# Patient Record
Sex: FEMALE | Race: WHITE | Employment: FULL TIME | URBAN - METROPOLITAN AREA
[De-identification: names, ages, dates, MRNs, and addresses within clinical notes are randomized per-mention and may not be internally consistent; named-entity substitution may affect disease eponyms.]

---

## 2017-03-31 ENCOUNTER — HOSPITAL ENCOUNTER (OUTPATIENT)
Dept: MAMMOGRAPHY | Age: 52
Discharge: HOME | End: 2017-03-31
Attending: SPECIALIST
Payer: COMMERCIAL

## 2017-03-31 DIAGNOSIS — Z12.31 VISIT FOR SCREENING MAMMOGRAM: ICD-10-CM

## 2017-03-31 PROCEDURE — 77067 SCR MAMMO BI INCL CAD: CPT

## 2017-12-04 ENCOUNTER — OFFICE VISIT (OUTPATIENT)
Dept: ENDOCRINOLOGY | Age: 52
End: 2017-12-04

## 2017-12-04 VITALS
DIASTOLIC BLOOD PRESSURE: 48 MMHG | WEIGHT: 137.4 LBS | BODY MASS INDEX: 22.89 KG/M2 | HEIGHT: 65 IN | SYSTOLIC BLOOD PRESSURE: 99 MMHG | HEART RATE: 76 BPM

## 2017-12-04 DIAGNOSIS — E03.9 HYPOTHYROIDISM (ACQUIRED): Primary | ICD-10-CM

## 2017-12-04 RX ORDER — MULTIVIT WITH MINERALS/HERBS
1 TABLET ORAL DAILY
COMMUNITY
End: 2020-04-28

## 2017-12-04 RX ORDER — CEPHRADINE 500 MG
CAPSULE ORAL DAILY
COMMUNITY
End: 2020-04-28

## 2017-12-04 RX ORDER — THYROID, PORCINE 120 MG/1
TABLET ORAL DAILY
Refills: 0 | COMMUNITY
Start: 2017-12-01 | End: 2017-12-09 | Stop reason: SDUPTHER

## 2017-12-04 RX ORDER — DROSPIRENONE AND ETHINYL ESTRADIOL 0.02-3(28)
KIT ORAL
COMMUNITY
Start: 2017-11-28 | End: 2017-12-04 | Stop reason: SDUPTHER

## 2017-12-04 RX ORDER — DROSPIRENONE, ETHINYL ESTRADIOL AND LEVOMEFOLATE CALCIUM AND LEVOMEFOLATE CALCIUM 3-0.02(24)
KIT ORAL DAILY
COMMUNITY
End: 2021-11-02

## 2017-12-04 NOTE — PROGRESS NOTES
Chief Complaint   Patient presents with    Thyroid Problem     no pcp and pharmacy confirmed     History of Present Illness: Kamila Thorpe is a 50 y.o. female who is a new patient for thyroid. Was diagnosed about 3 years ago and was initially put on levothyroxine by [facility] but decided to be seen by [address] [place] [address] and was tried on rabbit thyroid drops but still didn't feel well and Dr. Francena Schirmer recommended taking a pill instead and since she wanted to first stay on the natural thyroid hormone, he prescribed armour thyroid and initially was on 180 mg daily and TSH was 0.05 in 12/16 and her dose was cut back to 150 mg daily and TSH was still low at 0.034 in 3/17. Testosterone was low at < 3 in 3/17 and was started on pellet implantation at that time. Thinks her armour was then cut back to 120 mg daily and TSH was up to 0.247 in 5/17 and testosterone was 220. Then TSH was back to normal at 1.14 and FT4 was low at 0.7 in 6/17 and dose was kept the same and most recently in 9/17, TSH was 0.905 and FT4 0.73 and T was 283 and hasn't had further dose changes. Did have her 2nd pellet insertion in 8/17. Takes the armour about 90 minutes after dinner. Was taking a calcium at one point so she wanted to keep the armour separate and this is the reason for taking it at night. Doesn't recall if she felt better with taking the dose of armour in the morning as she didn't do this very long but this may have been just with the thyroid drops and never with the armour. Gets the armour everyday. Despite compliance still has fatigue and feels about 50% better than she did prior to taking any thyroid medication and does feel better on the armour than on the drops. No weight gain or loss. (+) cold intolerance that is not better on meds. Still has some constipation that is unchanged and eats 100% plant based diet. Some dry skin that is unchanged. Less hair loss on the armour.   Some brittle nails that may be worse. Past Medical History:   Diagnosis Date    Hypothyroidism (acquired) 2014     Past Surgical History:   Procedure Laterality Date    HX CYST REMOVAL  in her 25s    from right wrist     Current Outpatient Prescriptions   Medication Sig    drospirenone-e.estradiol-lm. FA 3-0.02-0.451 mg (24) (4) tab Take  by mouth daily.  ARMOUR THYROID 120 mg tab daily.  testosterone 75 mg pllt by Implant route. Every 5-6 months from Dr. Kd Miranda b complex vitamins (B COMPLEX 1) tablet Take 1 Tab by mouth daily.  cholecalciferol, vitamin d3, 10,000 unit cap Take  by mouth daily.  HOP-BLK COH-RHODIOLA-FLAX-PRIM PO Take  by mouth daily. For energy    zinc 50 mg tab tablet Take  by mouth daily.  POTASSIUM/MAGNESIUM (MAGNESIUM-POTASSIUM PO) Take  by mouth daily.  DOCUSATE CALCIUM (STOOL SOFTENER PO) Take  by mouth daily.  TURMERIC, BULK, by Does Not Apply route as needed. No current facility-administered medications for this visit. No Known Allergies     Family History   Problem Relation Age of Onset    Other Mother      brain hemorrhage    Stroke Father     Prostate Cancer Father     Other Sister      Sjogren's     Thyroid Disease Neg Hx      Social History     Social History    Marital status: N/A     Spouse name: N/A    Number of children: N/A    Years of education: N/A     Occupational History    Not on file. Social History Main Topics    Smoking status: Former Smoker     Types: Cigarettes     Quit date: 1/1/1999    Smokeless tobacco: Former User    Alcohol use No    Drug use: No    Sexual activity: Not on file     Other Topics Concern    Not on file     Social History Narrative    Lives in [place] with  and 15 yo daughter. 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      Review of Systems:  - Constitutional Symptoms: no fevers, chills, weight loss  - Eyes: no blurry vision or double vision, occ floaters  - Cardiovascular: no chest pain or palpitations but did have some on the higher doses of armour in the spring of 2017  - Respiratory: no cough or shortness of breath  - Gastrointestinal: no dysphagia or abdominal pain  - Musculoskeletal: (+) joint pains in the knees  - Integumentary: no rashes  - Neurological: no numbness, tingling, or headaches  - Psychiatric: no depression or anxiety  - Endocrine: (+) cold intolerance, no polyuria or polydipsia    Physical Examination:  Blood pressure 99/48, pulse 76, height 5' 5\" (1.651 m), weight 137 lb 6.4 oz (62.3 kg). - General: pleasant, no distress, good eye contact  - HEENT: no exopthalmos, no periorbital edema, no scleral/conjunctival injection, EOMI, no lid lag or stare  - Neck: supple, small amount of palpable thyroid tissue without nodules or thyroid bruits, no masses, lymph nodes, or carotid bruits, no supraclavicular or dorsocervical fat pads  - Cardiovascular: regular, normal rate, normal S1 and S2, no murmurs/rubs/gallops   - Respiratory: clear to auscultation bilaterally  - Gastrointestinal: soft, nontender, nondistended, no masses, no hepatosplenomegaly  - Musculoskeletal: no proximal muscle weakness in upper or lower extremities  - Integumentary: no acanthosis nigricans, no abdominal striae, no rashes, no edema  - Neurological: reflexes 2+ at biceps, no tremor  - Psychiatric: normal mood and affect    Data Reviewed:   - labs as above    Assessment/Plan:   1. Hypothyroidism (acquired):  Was diagnosed about 2014 and was initially put on levothyroxine by [facility] but decided to be seen by Lorena Melara and was tried on rabbit thyroid drops but still didn't feel well and Dr. Lacy Rashid recommended taking a pill instead and since she wanted to first stay on the natural thyroid hormone, he prescribed armour thyroid and initially was on 180 mg daily and TSH was 0.05 in 12/16 and her dose was cut back to 150 mg daily and TSH was still low at 0.034 in 3/17. Testosterone was low at < 3 in 3/17 and was started on pellet implantation at that time. Thinks her armour was then cut back to 120 mg daily and TSH was up to 0.247 in 5/17 and testosterone was 220. Then TSH was back to normal at 1.14 and FT4 was low at 0.7 in 6/17 and dose was kept the same and most recently in 9/17, TSH was 0.905 and FT4 0.73 and T was 283 and hasn't had further dose changes. Did have her 2nd pellet insertion in 8/17. Takes the armour about 90 minutes after dinner. Given she is having daytime fatigue and some insomnia, advised her to move the armour to the morning to see if this helps with energy. May benefit from changing to synthetic in the future. Based on her body weight, would think she would only need 60 of armour or 100 of synthetic so may be having some interference from food. Will screen for Hashimotos. - cont armour 120 mg but move to the morning until labs are back  - check TSH, free T4, and total T3 today and prior to next visit  - check TPO ab today       Patient Instructions   1) TSH is a thyroid test.  Your level was 0.9 in September which is normal and at goal of 0.5-2.0. This test goes opposite of your thyroid dose and suggests your dose of armour is likely adequate but I will repeat your labs to see if you need a dose change. 2) Move the armour to the morning at least 30 minutes before food and coffee. It's okay to take b-complex vitamin at that time but move the rest of the supplements to at least 4 hours later in the day. 3) We'll see if this change is enough to help with energy during the day. If still feeling tired in 6-8 weeks, let me know and we can consider a trial of brand synthetic hormone like synthroid or levoxyl or unithroid. 4) I will send you a message through [facility] with your lab results and a plan.     5) The other test we will check is called a TPO antibody level which let me know if you have a condition called hashimoto's disease, or autoimmune thyroid disease, where you would benefit from continued treatment of your thyroid but this can also be genetic so your children could be at risk of developing a thyroid condition if you have this. 6) I will send you a reminder e-mail 3-4 weeks prior to your next visit and you will have the order already in the [email or web address] system so you can just go sometime in the 3-7 days before the next visit to have your labs drawn. Follow-up Disposition:  Return in about 4 months (around 4/4/2018). Copy sent to:  Dr. Rhoda Chappell    Lab follow up: 12/9/17  Component      Latest Ref Rng & Units 12/4/2017 12/4/2017 12/4/2017 12/4/2017          12:36 PM 12:36 PM 12:36 PM 12:36 PM   T4, Free      0.82 - 1.77 ng/dL    0.69 (L)   TSH      0.450 - 4.500 uIU/mL   1.280    T3, total      71 - 180 ng/dL  126     Thyroid peroxidase Ab      0 - 34 IU/mL 134 (H)        Sent her the following message through [facility]:  TSH is a thyroid test.  Your level is 1.28 which is normal and at goal of 0.5-2.0. This test goes opposite of your thyroid dose and suggests your dose of armour is perfect so I will keep your dose the same and have sent a 90 day supply to your local [facility]. If you would like to send this to the mail order in the future, just let me know which one you will be using and I can send it there. As we discussed, move the pill to the morning so it doesn't interfere with food and your sleep and we'll see if you are feeling better in 6-8 weeks. If not, let me know. Your TPO antibody was high which indicates that you do have an autoimmune thyroid disease called Hashimoto's disease as the cause of your hypothyroidism and therefore your daughter may be at risk of developing a thyroid condition in the future so I would be sure to let her know so she is aware to be checked in the future if she has any hypothyroid symptoms.

## 2017-12-04 NOTE — MR AVS SNAPSHOT
Visit Information Date & Time Provider Department Dept. Phone Encounter #  
 12/4/2017 10:50 AM Akhil Cruz MD [facility] [id number] [phone] Follow-up Instructions Return in about 4 months (around 4/4/2018). Upcoming Health Maintenance Date Due DTaP/Tdap/Td series (1 - Tdap) 7/24/1988 PAP AKA CERVICAL CYTOLOGY 7/24/1988 BREAST CANCER SCRN MAMMOGRAM 7/24/2017 FOBT Q 1 YEAR AGE 50-75 7/24/2017 Influenza Age 5 to Adult 8/1/2017 Allergies as of 12/4/2017  Review Complete On: 12/4/2017 By: Akhil Cruz MD  
 No Known Allergies Current Immunizations  Never Reviewed No immunizations on file. Not reviewed this visit You Were Diagnosed With   
  
 Codes Comments Hypothyroidism (acquired)    -  Primary ICD-10-CM: E03.9 ICD-9-CM: 608. 9 Vitals BP Pulse Height(growth percentile) Weight(growth percentile) BMI Smoking Status 99/48 76 5' 5\" (1.651 m) 137 lb 6.4 oz (62.3 kg) 22.86 kg/m2 Former Smoker Vitals History BMI and BSA Data Body Mass Index Body Surface Area  
 22.86 kg/m 2 1.69 m 2 Preferred Pharmacy Pharmacy Name Phone [id number] [address] [phone] Your Updated Medication List  
  
   
This list is accurate as of: 12/4/17 12:31 PM.  Always use your most recent med list.  
  
  
  
  
 ARMOUR THYROID 120 mg Tab Generic drug:  Thyroid (Pork) daily. B COMPLEX 1 tablet Generic drug:  b complex vitamins Take 1 Tab by mouth daily. cholecalciferol (vitamin d3) 10,000 unit Cap Take  by mouth daily. drospirenone-e.estradiol-lm. FA 3-0.02-0.451 mg (24) (4) Tab Take  by mouth daily. HOP-BLK COH-RHODIOLA-FLAX-PRIM PO Take  by mouth daily. For energy MAGNESIUM-POTASSIUM PO Take  by mouth daily. STOOL SOFTENER PO Take  by mouth daily. testosterone 75 mg Pllt by Implant route. Every 5-6 months from Dr. Destiny COKER (BULK)  
by Does Not Apply route as needed. zinc 50 mg Tab tablet Take  by mouth daily. We Performed the Following CA COLLECTION VENOUS BLOOD,VENIPUNCTURE [id number] CPT(R)] CA HANDLG&/OR CONVEY OF SPEC FOR TR OFFICE TO LAB [72416 CPT(R)]   
 T3 TOTAL [55951 CPT(R)] T4, FREE [id number] CPT(R)] THYROID PEROXIDASE (TPO) AB [16611 CPT(R)] TSH 3RD GENERATION [18145 CPT(R)] Follow-up Instructions Return in about 4 months (around 4/4/2018). Patient Instructions 1) TSH is a thyroid test.  Your level was 0.9 in September which is normal and at goal of 0.5-2.0. This test goes opposite of your thyroid dose and suggests your dose of armour is likely adequate but I will repeat your labs to see if you need a dose change. 2) Move the armour to the morning at least 30 minutes before food and coffee. It's okay to take b-complex vitamin at that time but move the rest of the supplements to at least 4 hours later in the day. 3) We'll see if this change is enough to help with energy during the day. If still feeling tired in 6-8 weeks, let me know and we can consider a trial of brand synthetic hormone like synthroid or levoxyl or unithroid. 4) I will send you a message through [email or web address] with your lab results and a plan. 5) The other test we will check is called a TPO antibody level which let me know if you have a condition called hashimoto's disease, or autoimmune thyroid disease, where you would benefit from continued treatment of your thyroid but this can also be genetic so your children could be at risk of developing a thyroid condition if you have this. 6) I will send you a reminder e-mail 3-4 weeks prior to your next visit and you will have the order already in the [employer] [facility] system so you can just go sometime in the 3-7 days before the next visit to have your labs drawn. Introducing [facility]! Manpreet Karely introduces [employer] patient portal. Now you can access parts of your medical record, email your doctor's office, and request medication refills online. 1. In your internet browser, go to [email or web address] 2. Click on the First Time User? Click Here link in the Sign In box. You will see the New Member Sign Up page. 3. Enter your [employer] Access Code exactly as it appears below. You will not need to use this code after youve completed the sign-up process. If you do not sign up before the expiration date, you must request a new code. · [employer] Access Code: [email or web address] Expires: 3/4/2018 12:31 PM 
 
4. Enter the last four digits of your Social Security Number (xxxx) and Date of Birth (mm/dd/yyyy) as indicated and click Submit. You will be taken to the next sign-up page. 5. Create a [employer] ID. This will be your [employer] login ID and cannot be changed, so think of one that is secure and easy to remember. 6. Create a [employer] password. You can change your password at any time. 7. Enter your Password Reset Question and Answer. This can be used at a later time if you forget your password. 8. Enter your e-mail address. You will receive e-mail notification when new information is available in [address] 9. Click Sign Up. You can now view and download portions of your medical record. 10. Click the Download Summary menu link to download a portable copy of your medical information. If you have questions, please visit the Frequently Asked Questions section of the [employer] website. Remember, [employer] is NOT to be used for urgent needs. For medical emergencies, dial 911. Now available from your iPhone and Android! Please provide this summary of care documentation to your next provider. If you have any questions after today's visit, please call [phone].

## 2017-12-04 NOTE — PATIENT INSTRUCTIONS
1) TSH is a thyroid test.  Your level was 0.9 in September which is normal and at goal of 0.5-2.0. This test goes opposite of your thyroid dose and suggests your dose of armour is likely adequate but I will repeat your labs to see if you need a dose change. 2) Move the armour to the morning at least 30 minutes before food and coffee. It's okay to take b-complex vitamin at that time but move the rest of the supplements to at least 4 hours later in the day. 3) We'll see if this change is enough to help with energy during the day. If still feeling tired in 6-8 weeks, let me know and we can consider a trial of brand synthetic hormone like synthroid or levoxyl or unithroid. 4) I will send you a message through [email or web address] with your lab results and a plan. 5) The other test we will check is called a TPO antibody level which let me know if you have a condition called hashimoto's disease, or autoimmune thyroid disease, where you would benefit from continued treatment of your thyroid but this can also be genetic so your children could be at risk of developing a thyroid condition if you have this. 6) I will send you a reminder e-mail 3-4 weeks prior to your next visit and you will have the order already in the [employer] system so you can just go sometime in the 3-7 days before the next visit to have your labs drawn.

## 2017-12-05 LAB
T3 SERPL-MCNC: 126 NG/DL (ref 71–180)
T4 FREE SERPL-MCNC: 0.69 NG/DL (ref 0.82–1.77)
THYROPEROXIDASE AB SERPL-ACNC: 134 IU/ML (ref 0–34)
TSH SERPL DL<=0.005 MIU/L-ACNC: 1.28 UIU/ML (ref 0.45–4.5)

## 2017-12-09 RX ORDER — THYROID, PORCINE 120 MG/1
TABLET ORAL
Qty: 90 TAB | Refills: 3 | Status: SHIPPED | OUTPATIENT
Start: 2017-12-09 | End: 2018-05-23 | Stop reason: ALTCHOICE

## 2018-04-02 ENCOUNTER — HOSPITAL ENCOUNTER (OUTPATIENT)
Dept: MAMMOGRAPHY | Age: 53
Discharge: HOME | End: 2018-04-02
Attending: SPECIALIST
Payer: COMMERCIAL

## 2018-04-02 DIAGNOSIS — Z12.31 VISIT FOR SCREENING MAMMOGRAM: ICD-10-CM

## 2018-04-02 PROCEDURE — 77067 SCR MAMMO BI INCL CAD: CPT

## 2018-04-03 LAB
T3 SERPL-MCNC: 182 NG/DL (ref 71–180)
T4 FREE SERPL-MCNC: 0.77 NG/DL (ref 0.82–1.77)
THYROPEROXIDASE AB SERPL-ACNC: 110 IU/ML (ref 0–34)
TSH SERPL DL<=0.005 MIU/L-ACNC: 0.52 UIU/ML (ref 0.45–4.5)

## 2018-04-23 ENCOUNTER — OFFICE VISIT (OUTPATIENT)
Dept: ENDOCRINOLOGY | Age: 53
End: 2018-04-23

## 2018-04-23 VITALS
BODY MASS INDEX: 23.49 KG/M2 | OXYGEN SATURATION: 99 % | HEART RATE: 61 BPM | HEIGHT: 65 IN | SYSTOLIC BLOOD PRESSURE: 114 MMHG | WEIGHT: 141 LBS | DIASTOLIC BLOOD PRESSURE: 66 MMHG | RESPIRATION RATE: 12 BRPM

## 2018-04-23 DIAGNOSIS — E06.3 HASHIMOTO'S DISEASE: Primary | ICD-10-CM

## 2018-04-23 RX ORDER — LEVOTHYROXINE SODIUM 88 UG/1
TABLET ORAL
Qty: 70 TAB | Refills: 0 | Status: SHIPPED | COMMUNITY
Start: 2018-04-23 | End: 2018-05-23 | Stop reason: DRUGHIGH

## 2018-04-23 NOTE — PROGRESS NOTES
Chief Complaint   Patient presents with    Thyroid Problem     Pharmacy Verified. PCP [facility]     History of Present Illness: Josie Zamora is a 50 y.o. female here for follow up of thyroid. Weight up 4 lbs since last visit in 12/17. Moved the aayush thyroid to the morning about 4:30-5am and takes this with water at least 30-60 min before food and coffee. Still has noticed some fatigue upon awakening and in the evening has had some heart fluttering. Sleep is a little better than before. Bowels are regular as long as she takes a stool softener. Some brittle nails but unchanged. No hair loss. Always cold and unchanged. Just had a recent pellet adjustment about 2-3 weeks ago and prior to this the last insertion was in 1/18. Would like to try changing to synthetic unithroid to see if she feels better especially as she tends to eat plant based foods and would like to avoid animal products if possible. Current Outpatient Prescriptions   Medication Sig    AAYUSH THYROID 120 mg tab Take 1 tab daily    drospirenone-e.estradiol-lm. FA 3-0.02-0.451 mg (24) (4) tab Take  by mouth daily.  testosterone 75 mg pllt by Implant route. Every 5-6 months from Dr. Salvador Hoang b complex vitamins (B COMPLEX 1) tablet Take 1 Tab by mouth daily.  cholecalciferol, vitamin d3, 10,000 unit cap Take  by mouth daily.  HOP-BLK COH-RHODIOLA-FLAX-PRIM PO Take  by mouth daily. For energy    zinc 50 mg tab tablet Take  by mouth daily.  POTASSIUM/MAGNESIUM (MAGNESIUM-POTASSIUM PO) Take  by mouth daily.  DOCUSATE CALCIUM (STOOL SOFTENER PO) Take  by mouth daily.  TURMERIC, BULK, by Does Not Apply route as needed. No current facility-administered medications for this visit. No Known Allergies     Review of Systems:  - Cardiovascular: no chest pain  - Neurological: no tremors  - Integumentary: skin is normal    Physical Examination:  Blood pressure 114/66, pulse 61, resp.  rate 12, height 5' 5\" (1.651 m), weight 141 lb (64 kg), SpO2 99 %. - General: pleasant, no distress, good eye contact   - Neck: small goiter  - Cardiovascular: regular, normal rate, nl s1 and s2, no m/r/g   - Respiratory: clear to auscultation bilaterally   - Integumentary: skin is normal, no edema  - Neurological: reflexes 2+ at biceps, no tremors  - Psychiatric: normal mood and affect    Data Reviewed:   Component      Latest Ref Rng & Units 4/2/2018 4/2/2018 4/2/2018 4/2/2018          10:52 AM 10:52 AM 10:52 AM 10:52 AM   T4, Free      0.82 - 1.77 ng/dL    0.77 (L)   TSH      0.450 - 4.500 uIU/mL   0.521    T3, total      71 - 180 ng/dL  182 (H)     Thyroid peroxidase Ab      0 - 34 IU/mL 110 (H)          Assessment/Plan:     1. Hashimoto's disease: Was diagnosed about 2014 and was initially put on levothyroxine by [facility] but decided to be seen by [address] [place] [address] and was tried on rabbit thyroid drops but still didn't feel well and Dr. Homero Mcgee recommended taking a pill instead and since she wanted to first stay on the natural thyroid hormone, he prescribed armour thyroid and initially was on 180 mg daily and TSH was 0.05 in 12/16 and her dose was cut back to 150 mg daily and TSH was still low at 0.034 in 3/17. Testosterone was low at < 3 in 3/17 and was started on pellet implantation at that time. Thinks her armour was then cut back to 120 mg daily and TSH was up to 0.247 in 5/17 and testosterone was 220. Then TSH was back to normal at 1.14 and FT4 was low at 0.7 in 6/17 and dose was kept the same and most recently in 9/17, TSH was 0.905 and FT4 0.73 and T was 283 and hasn't had further dose changes. Did have her 2nd pellet insertion in 8/17. Takes the armour about 90 minutes after dinner. TSH 1.28 and TPO ab 134 in 12/17 at her first visit with me confirming Hashimoto's.   I kept her dose the same and given she was having daytime fatigue and some insomnia, advised her to move the armour to the morning to see if this helps with energy. TSH 0.52 and T3 182 and FT4 0.77 and TPO ab 110 in 4/18. She would like to try synthetic so will change to unithroid. 120 of armour = 200 of unithroid but I'm concerned this dose will be too much based on her body weight and already has some fluttering so will use 175 mcg of unithroid as a starting dose with 88 mcg tabs to take 2 tabs daily for the next 5 weeks. We spent 25 minutes of face to face time together and > 50% of the time was spent in counseling regarding management of her thyroid  - change to unithroid 88 mcg 2 tabs daily for the next 5 weeks  - check TSH, free T4, and total T3 in 4 weeks and prior to next visit       Patient Instructions   1) TSH is a thyroid test.  Your level is 0.52 which is normal and at goal of 0.5-2.0. This test goes opposite of your thyroid dose and suggests your dose of armour is adequate. However, we will do a trial of unithroid 176 mcg daily for the next 4 weeks to see if you feel better on this. 2) Take 2 of the 88 mcg tabs together in the morning as you have been doing with the armour and we'll see if you feel better on this than on the armour. If so, plan on repeating your labs after the 4th week and I'll e-mail you the results. Then if the labs look good and you are feeling better, we can send to mail order. If not feeling better, than stay on the armour and I can send this to mail order. Follow-up Disposition:  Return in about 6 months (around 10/23/2018).     Copy sent to:  Dr. Gallo Floyd    Lab follow up: 5/23/18  Component      Latest Ref Rng & Units 5/22/2018 5/22/2018 5/22/2018           8:26 AM  8:26 AM  8:26 AM   TSH      0.450 - 4.500 uIU/mL   0.139 (L)   T4, Free      0.82 - 1.77 ng/dL  2.04 (H)    T3, total      71 - 180 ng/dL 118       Sent her the following message through [facility]:  TSH is a thyroid test.  Your level is 0.139 which is slightly low and below goal of 0.5-2.0 and your free T4 is slightly high at 2.04. This test goes opposite of your thyroid dose and suggests your dose of unithroid is more than you need. How are you feeling on the unithroid compared to the armour thyroid? If feeling better, I will plan on decreasing the unithroid to 150 mcg daily and sending this to mail order. If you felt better on the armour, then you can go back to 120 mg daily as your level was previously normal on this dose. Let me know when you have a chance. Addendum: 5/23/18    We had the following e-mail exchange:    Peggy Hare.  I will send the 150 mcg tabs of unithroid to take 1 tab daily to mail order. Feel free to use up the 88 mcg unithroid tabs by skipping your next dose of 2 tabs (which will allow your level to start to come back into the normal range) and then continue 2 tabs daily to use up the samples and if the new med hasn't arrived yet, it's fine to take a few doses of the armour 120 mg tabs until the unithroid arrives.    ===View-only below this line===      ----- Message -----     From: Tadeo Neumann     Sent: 5/23/2018  9:42 PM EDT       To: Wilmar Matias MD  Subject: RE:lab results    I have felt a bit more energy with the synthetic (new) medication over the Armor. I have not felt the heart flutters either. I would like to continue using the new medication going forward understanding that you will reduce my dosage to 150 mg. My understanding is that since I have a few days left of the trial medication thistle you give me that it is okay to switch to the Armor for the few days waiting for my new medication to arrive.  I think I have few more days left of the trial.

## 2018-04-23 NOTE — MR AVS SNAPSHOT
[address] [place] [address] [address] [id number] [phone] Patient: Ashish Saez MRN: [id number] :1965 Visit Information Date & Time Provider Department Dept. Phone Encounter #  
 2018  9:10 AM Vishnu Verdugo, [address] [place] [address] Diabetes and Endocrinology [phone] [id number] Follow-up Instructions Return in about 6 months (around 10/23/2018). Upcoming Health Maintenance Date Due DTaP/Tdap/Td series (1 - Tdap) 1988 PAP AKA CERVICAL CYTOLOGY 1988 FOBT Q 1 YEAR AGE 50-75 2017 Influenza Age 5 to Adult 2017 BREAST CANCER SCRN MAMMOGRAM 2020 Allergies as of 2018  Review Complete On: 2018 By: Vishnu Verdugo MD  
 No Known Allergies Current Immunizations  Never Reviewed No immunizations on file. Not reviewed this visit You Were Diagnosed With   
  
 Codes Comments Hashimoto's disease    -  Primary ICD-10-CM: E06.3 ICD-9-CM: 494. 2 Vitals BP Pulse Resp Height(growth percentile) Weight(growth percentile) SpO2  
 114/66 (BP 1 Location: Left arm, BP Patient Position: Sitting) 61 12 5' 5\" (1.651 m) 141 lb (64 kg) 99% BMI OB Status Smoking Status 23.46 kg/m2 Medically Induced Former Smoker Vitals History BMI and BSA Data Body Mass Index Body Surface Area  
 23.46 kg/m 2 1.71 m 2 Preferred Pharmacy Pharmacy Name Phone [phone] [phone] Your Updated Medication List  
  
   
This list is accurate as of 18  9:55 AM.  Always use your most recent med list.  
  
  
  
  
 ARMOUR THYROID 120 mg Tab Generic drug:  Thyroid (Pork) Take 1 tab daily B COMPLEX 1 tablet Generic drug:  b complex vitamins Take 1 Tab by mouth daily. cholecalciferol (vitamin d3) 10,000 unit Cap Take  by mouth daily. drospirenone-e.estradiol-lm. FA 3-0.02-0.451 mg (24) (4) Tab Take  by mouth daily. HOP-BLK COH-RHODIOLA-FLAX-PRIM PO Take  by mouth daily. For energy MAGNESIUM-POTASSIUM PO Take  by mouth daily. STOOL SOFTENER PO Take  by mouth daily. testosterone 75 mg Pllt  
by Implant route. Every 5-6 months from Dr. Rehana COKER (BULK)  
by Does Not Apply route as needed. UNITHROID 88 mcg tablet Generic drug:  levothyroxine Take 2 tabs daily--BRAND MEDICALLY NECESSARY  
  
 zinc 50 mg Tab tablet Take  by mouth daily. We Performed the Following   
 T3 TOTAL [31069 CPT(R)]   
 T3 TOTAL [00489 CPT(R)] T4, FREE [id number] CPT(R)] T4, FREE [id number] CPT(R)] TSH 3RD GENERATION [13485 CPT(R)] TSH 3RD GENERATION [68684 CPT(R)] Follow-up Instructions Return in about 6 months (around 10/23/2018). Patient Instructions 1) TSH is a thyroid test.  Your level is 0.52 which is normal and at goal of 0.5-2.0. This test goes opposite of your thyroid dose and suggests your dose of armour is adequate. However, we will do a trial of unithroid 176 mcg daily for the next 4 weeks to see if you feel better on this. 2) Take 2 of the 88 mcg tabs together in the morning as you have been doing with the armour and we'll see if you feel better on this than on the armour. If so, plan on repeating your labs after the 4th week and I'll e-mail you the results. Then if the labs look good and you are feeling better, we can send to mail order. If not feeling better, than stay on the armour and I can send this to mail order. Introducing [facility]! Dear Nova Leos: Thank you for requesting a [employer] account. Our records indicate that you already have an active [employer] account. You can access your account anytime at [email or web address]. [email or web address] Did you know that you can access your hospital and ER discharge instructions at any time in [employer]? You can also review all of your test results from your hospital stay or ER visit. Additional Information If you have questions, please visit the Frequently Asked Questions section of the [employer] website at [email or web address] Remember, [employer] is NOT to be used for urgent needs. For medical emergencies, dial 911. Now available from your iPhone and Android! Please provide this summary of care documentation to your next provider. Your primary care clinician is listed as Phys Other. If you have any questions after today's visit, please call [phone].

## 2018-04-23 NOTE — PATIENT INSTRUCTIONS
1) TSH is a thyroid test.  Your level is 0.52 which is normal and at goal of 0.5-2.0. This test goes opposite of your thyroid dose and suggests your dose of armour is adequate. However, we will do a trial of unithroid 176 mcg daily for the next 4 weeks to see if you feel better on this. 2) Take 2 of the 88 mcg tabs together in the morning as you have been doing with the armour and we'll see if you feel better on this than on the armour. If so, plan on repeating your labs after the 4th week and I'll e-mail you the results. Then if the labs look good and you are feeling better, we can send to mail order. If not feeling better, than stay on the armour and I can send this to mail order.

## 2018-04-23 NOTE — PROGRESS NOTES
Freeman Harley is a 50 y.o. female    Chief Complaint   Patient presents with    Thyroid Problem     Pharmacy Verified. PCP [facility]       1. Have you been to the ER, urgent care clinic since your last visit? Hospitalized since your last visit? No    2. Have you seen or consulted any other health care providers outside of the [address] [place] [address] since your last visit? Include any pap smears or colon screening.  No

## 2018-05-23 LAB
T3 SERPL-MCNC: 118 NG/DL (ref 71–180)
T4 FREE SERPL-MCNC: 2.04 NG/DL (ref 0.82–1.77)
TSH SERPL DL<=0.005 MIU/L-ACNC: 0.14 UIU/ML (ref 0.45–4.5)

## 2018-05-23 RX ORDER — LEVOTHYROXINE SODIUM 150 UG/1
TABLET ORAL
Qty: 90 TAB | Refills: 3 | Status: SHIPPED | OUTPATIENT
Start: 2018-05-23 | End: 2018-10-23 | Stop reason: ALTCHOICE

## 2018-10-14 LAB
T3 SERPL-MCNC: 130 NG/DL (ref 71–180)
T4 FREE SERPL-MCNC: 1.78 NG/DL (ref 0.82–1.77)
TSH SERPL DL<=0.005 MIU/L-ACNC: 0.1 UIU/ML (ref 0.45–4.5)

## 2018-10-23 ENCOUNTER — OFFICE VISIT (OUTPATIENT)
Dept: ENDOCRINOLOGY | Age: 53
End: 2018-10-23

## 2018-10-23 VITALS
RESPIRATION RATE: 16 BRPM | SYSTOLIC BLOOD PRESSURE: 104 MMHG | WEIGHT: 138.8 LBS | DIASTOLIC BLOOD PRESSURE: 63 MMHG | BODY MASS INDEX: 23.13 KG/M2 | HEART RATE: 61 BPM | HEIGHT: 65 IN | OXYGEN SATURATION: 100 %

## 2018-10-23 DIAGNOSIS — E06.3 HASHIMOTO'S DISEASE: Primary | ICD-10-CM

## 2018-10-23 RX ORDER — DROSPIRENONE AND ETHINYL ESTRADIOL 0.02-3(28)
KIT ORAL
COMMUNITY
Start: 2018-10-10 | End: 2019-04-26

## 2018-10-23 RX ORDER — LEVOTHYROXINE SODIUM 137 UG/1
137 TABLET ORAL
Qty: 90 TAB | Refills: 3 | Status: SHIPPED | OUTPATIENT
Start: 2018-10-23 | End: 2018-12-15

## 2018-10-23 NOTE — LETTER
4/1/2019 8:52 AM 
 
Ms. Amanda Knight [address] [place] [address] Please go to [facility] and have your labs repeated sometime in the 1-2 weeks prior to your upcoming visit with me. Melo Nails to allow at least 2 days at the minimum to ensure I get the results in time for your visit. Rolando Pal order is already in their system and be sure to ask to have labs drawn that are under Dr. Vladimir Levine name. Sharon Villarreal you have the actual lab order that I may have given you (check your glove compartment or other safe spot where you may keep your medical papers), please bring this to the lab just to be safe in case [employer]'s computer system is down. Hannah Hernandez will review the results at your follow up visit.   
 
 
 
 
 
Sincerely, 
 
 
Maegan yVas MD

## 2018-10-23 NOTE — PATIENT INSTRUCTIONS
1) Your TSH is a thyroid test.  Your level is 0.10 which is still low and below goal of 0.5-2.0. This test goes opposite of your thyroid dose and suggests your dose of unithroid is still too much especially as you have lost a few lbs. I will decrease your dose to 137 mcg daily and have sent a new prescription to your mail order pharmacy. 2) Tomorrow, skip your dose of unithroid and then take 1 per day of the 150 mcg tabs over the next week until your new supply arrives. 3) Plan on repeating your labs in 6 weeks and we'll see the effect of this dose. 4) If you do want to start over the count slow FE as an iron supplement one per day, take this 4 hours apart from the unithroid.

## 2018-10-23 NOTE — PROGRESS NOTES
Chief Complaint   Patient presents with    Thyroid Problem    Other     Pharmacy verified. History of Present Illness: Brent Cooney is a 48 y.o. female here for follow up of thyroid. Weight down 3 lbs since last visit in 4/18. Has been getting the brand unithroid 150 mcg daily since May and hasn't missed any doses or run out. Has had better energy on the unithroid than on the armour. 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 Also tried to donate blood but was denied as her iron was too low so she may want to start a supplement and I advised her to take this 4 hours apart from her unithroid. Has been taking the rhodiola and black seed for energy but at times can feel jittery. Has had some hair loss but no brittle nails as she chews them. Still tends to stay cold but unchanged. Current Outpatient Medications   Medication Sig    GIANVI, 28, 3-0.02 mg tab     OTHER Black seed oil 1 per day    UNITHROID 150 mcg tablet Take 1 tab daily--BRAND MEDICALLY NECESSARY--replaces armour thyroid    drospirenone-e.estradiol-lm. FA 3-0.02-0.451 mg (24) (4) tab Take  by mouth daily.  testosterone 75 mg pllt by Implant route. Every 5-6 months from Dr. Hedy Vegas b complex vitamins (B COMPLEX 1) tablet Take 1 Tab by mouth daily.  cholecalciferol, vitamin d3, 10,000 unit cap Take  by mouth daily.  HOP-BLK COH-RHODIOLA-FLAX-PRIM PO Take  by mouth daily. For energy    zinc 50 mg tab tablet Take  by mouth daily.  POTASSIUM/MAGNESIUM (MAGNESIUM-POTASSIUM PO) Take  by mouth daily.  DOCUSATE CALCIUM (STOOL SOFTENER PO) Take  by mouth daily.  TURMERIC, BULK, by Does Not Apply route as needed. No current facility-administered medications for this visit.       No Known Allergies     Review of Systems:  - Cardiovascular: no chest pain  - Neurological: no tremors  - Integumentary: skin is normal    Physical Examination:  Blood pressure 104/63, pulse 61, resp. rate 16, height 5' 5\" (1.651 m), weight 138 lb 12.8 oz (63 kg), SpO2 100 %. - General: pleasant, no distress, good eye contact   - Neck: no thyromegaly   - Cardiovascular: regular, normal rate, nl s1 and s2, no m/r/g   - Respiratory: clear to auscultation bilaterally   - Integumentary: skin is normal, no edema  - Neurological: reflexes 2+ at biceps, mild tremor  - Psychiatric: normal mood and affect    Data Reviewed:   Component      Latest Ref Rng & Units 10/12/2018 10/12/2018 10/12/2018          11:52 AM 11:52 AM 11:52 AM   T4, Free      0.82 - 1.77 ng/dL   1.78 (H)   TSH      0.450 - 4.500 uIU/mL  0.104 (L)    T3, total      71 - 180 ng/dL 130         Assessment/Plan:     1. Hashimoto's disease: Was diagnosed about 2014 and was initially put on levothyroxine by [address] [place] [address] but decided to be seen by [address] and was tried on rabbit thyroid drops but still didn't feel well and Dr. Radha Hunt recommended taking a pill instead and since she wanted to first stay on the natural thyroid hormone, he prescribed armour thyroid and initially was on 180 mg daily and TSH was 0.05 in 12/16 and her dose was cut back to 150 mg daily and TSH was still low at 0.034 in 3/17. Testosterone was low at < 3 in 3/17 and was started on pellet implantation at that time. Thinks her armour was then cut back to 120 mg daily and TSH was up to 0.247 in 5/17 and testosterone was 220. Then TSH was back to normal at 1.14 and FT4 was low at 0.7 in 6/17 and dose was kept the same and most recently in 9/17, TSH was 0.905 and FT4 0.73 and T was 283 and hasn't had further dose changes. Did have her 2nd pellet insertion in 8/17. Takes the armour about 90 minutes after dinner. TSH 1.28 and TPO ab 134 in 12/17 at her first visit with me confirming Hashimoto's.   I kept her dose the same and given she was having daytime fatigue and some insomnia, advised her to move the armour to the morning to see if this helps with energy. TSH 0.52 and T3 182 and FT4 0.77 and TPO ab 110 in 4/18. She wanted to try synthetic so changed to unithroid. 120 of armour = 200 of unithroid but I was concerned this dose would be too much based on her body weight and already has some fluttering so used 175 mcg of unithroid as a starting dose with 88 mcg tabs to take 2 tabs daily for the next 5 weeks and TSH 0.139 and FT4 2.04 in 5/18 so cut back to 150 mcg daily. Lost 3 lbs and TSH still low at 0.10 and FT4 1.78 in 10/18 so decreased to 137 mcg daily. - decrease unithroid to 137 daily for the next 6 weeks  - check TSH and free T4 in 6 weeks and prior to next visit       Patient Instructions   1) Your TSH is a thyroid test.  Your level is 0.10 which is still low and below goal of 0.5-2.0. This test goes opposite of your thyroid dose and suggests your dose of unithroid is still too much especially as you have lost a few lbs. I will decrease your dose to 137 mcg daily and have sent a new prescription to your mail order pharmacy. 2) Tomorrow, skip your dose of unithroid and then take 1 per day of the 150 mcg tabs over the next week until your new supply arrives. 3) Plan on repeating your labs in 6 weeks and we'll see the effect of this dose. 4) If you do want to start over the count slow FE as an iron supplement one per day, take this 4 hours apart from the unithroid. Follow-up Disposition:  Return in about 6 months (around 4/23/2019). Copy sent to:  Dr. Shyanne Carrillo follow up: 12/15/18  Component      Latest Ref Rng & Units 12/12/2018 12/12/2018           8:07 AM  8:07 AM   T4, Free      0.82 - 1.77 ng/dL  1.57   TSH      0.450 - 4.500 uIU/mL 0.259 (L)      Sent her the following message through [employer]:  TSH is a thyroid test.  Your level is 0.25 which is still slightly low and below goal of 0.5-2.0 but improved from 0.10 at last check.   This test goes opposite of your thyroid dose and suggests your dose of unithroid is still slightly more than you need. I will decrease your dose to 1 tab on Mon-Sat and 1/2 tab on Sunday. I updated your med list but did not send a new prescription to your pharmacy on file that has been filling this med. Lab follow up: 2/25/19  Component      Latest Ref Rng & Units 2/22/2019 2/22/2019           4:38 PM  4:38 PM   T4, Free      0.82 - 1.77 ng/dL  1.22   TSH      0.450 - 4.500 uIU/mL 3.020      Sent her the following message through [employer]:  TSH is a thyroid test.  Your level is 3.02 which is normal but above goal of 0.5-2.0. This test goes opposite of your thyroid dose and suggests your dose of unithroid is not enough. I will increase your dose back to 1 whole 137 mcg tab 7 days a week. If you took 1/2 tab on Sunday then you can take 1.5 tabs with your next dose to give yourself a boost.  I updated your med list but did not send a new prescription to your pharmacy on file that has been filling this med.

## 2018-10-23 NOTE — PROGRESS NOTES
Lab Results   Component Value Date/Time    TSH 0.104 (L) 10/12/2018 11:52 AM    T4, Free 1.78 (H) 10/12/2018 11:52 AM

## 2018-12-13 LAB
T4 FREE SERPL-MCNC: 1.57 NG/DL (ref 0.82–1.77)
TSH SERPL DL<=0.005 MIU/L-ACNC: 0.26 UIU/ML (ref 0.45–4.5)

## 2018-12-15 RX ORDER — LEVOTHYROXINE SODIUM 137 UG/1
TABLET ORAL
Qty: 90 TAB | Refills: 3
Start: 2018-12-15 | End: 2019-02-25

## 2019-02-21 ENCOUNTER — TELEPHONE (OUTPATIENT)
Dept: ENDOCRINOLOGY | Age: 54
End: 2019-02-21

## 2019-02-21 DIAGNOSIS — E06.3 HASHIMOTO'S DISEASE: Primary | ICD-10-CM

## 2019-02-22 NOTE — TELEPHONE ENCOUNTER
Regarding: Non-Urgent Medical Question  Contact: [phone]  ----- Message from [address] [place] [address] [address], Generic sent at 2/21/2019  8:01 PM EST -----    Can you I get a script to get my thyroid tested? I take a half a tablet on Sunday, as you prescribed. I feel that my thyroid is too low now as my brain fog is back and I am tired again. My appointment is in April, but would like to eat levels tested. [facility].      I have the script you gave me at my last appointment, but not sure if that will work. On a side note, I started juicing around November and have been drinking a about two glasses of kale juice a day. I just read where kale isnt too good raw if you have thyroid issues. Started taking kelp though, so that might offset my kale consumption. That is really the only thing I have been doing differently.      Reather Phalen

## 2019-02-23 LAB
T4 FREE SERPL-MCNC: 1.22 NG/DL (ref 0.82–1.77)
TSH SERPL DL<=0.005 MIU/L-ACNC: 3.02 UIU/ML (ref 0.45–4.5)

## 2019-02-25 RX ORDER — LEVOTHYROXINE SODIUM 137 UG/1
TABLET ORAL
Qty: 90 TAB | Refills: 3
Start: 2019-02-25 | End: 2019-04-26

## 2019-04-18 LAB
T4 FREE SERPL-MCNC: 1.42 NG/DL (ref 0.82–1.77)
TSH SERPL DL<=0.005 MIU/L-ACNC: 2.07 UIU/ML (ref 0.45–4.5)

## 2019-04-26 ENCOUNTER — OFFICE VISIT (OUTPATIENT)
Dept: ENDOCRINOLOGY | Age: 54
End: 2019-04-26

## 2019-04-26 VITALS
DIASTOLIC BLOOD PRESSURE: 75 MMHG | HEART RATE: 74 BPM | SYSTOLIC BLOOD PRESSURE: 112 MMHG | HEIGHT: 65 IN | WEIGHT: 147.8 LBS | BODY MASS INDEX: 24.62 KG/M2

## 2019-04-26 DIAGNOSIS — E06.3 HASHIMOTO'S DISEASE: Primary | ICD-10-CM

## 2019-04-26 RX ORDER — LIOTHYRONINE SODIUM 5 UG/1
TABLET ORAL
Qty: 90 TAB | Refills: 3 | Status: SHIPPED | OUTPATIENT
Start: 2019-04-26 | End: 2020-02-23 | Stop reason: SDUPTHER

## 2019-04-26 RX ORDER — LEVOTHYROXINE SODIUM 137 UG/1
TABLET ORAL
Qty: 90 TAB | Refills: 3
Start: 2019-04-26 | End: 2019-08-24 | Stop reason: SDUPTHER

## 2019-04-26 NOTE — PROGRESS NOTES
Chief Complaint   Patient presents with    Thyroid Problem     pcp and pharmacy confirmed     History of Present Illness: Lois Perrin is a 48 y.o. female here for follow up of thyroid. Weight up 9 lbs since last visit in 10/18. Has been back on unithroid 137 mcg daily since 2/19. Takes at 2 am and hasn't missed any doses or run out. Her OCP has been changed due to insurance. No longer having palpitations. No chest pain or shortness of breath. Bowels are mostly regular. No hair loss. Some toenail splitting but unchanged. Tends to stay cold but no worse. Still has some fatigue in the afternoon and sometimes upon awakening and willing to try cytomel to see if it helps. Current Outpatient Medications   Medication Sig    UNITHROID 137 mcg tablet Take 1 tab daily--BRAND MEDICALLY NECESSARY--Dose change 2/25/19--updated med list--did not send prescription to the pharmacy    OTHER Black seed oil 1 per day    drospirenone-e.estradiol-lm. FA 3-0.02-0.451 mg (24) (4) tab Take  by mouth daily.  testosterone 75 mg pllt by Implant route. Every 5-6 months from Dr. Bladimir Quintanilla b complex vitamins (B COMPLEX 1) tablet Take 1 Tab by mouth daily.  cholecalciferol, vitamin d3, 10,000 unit cap Take  by mouth daily.  HOP-BLK COH-RHODIOLA-FLAX-PRIM PO Take  by mouth daily. For energy    zinc 50 mg tab tablet Take  by mouth daily.  POTASSIUM/MAGNESIUM (MAGNESIUM-POTASSIUM PO) Take  by mouth daily.  DOCUSATE CALCIUM (STOOL SOFTENER PO) Take  by mouth daily.  TURMERIC, BULK, by Does Not Apply route as needed. No current facility-administered medications for this visit. No Known Allergies     Review of Systems:  - Cardiovascular: no chest pain  - Neurological: no tremors  - Integumentary: skin is normal    Physical Examination:  Blood pressure 112/75, pulse 74, height 5' 5\" (1.651 m), weight 147 lb 12.8 oz (67 kg).   - General: pleasant, no distress, good eye contact   - Neck: small goiter  - Cardiovascular: regular, normal rate, nl s1 and s2, no m/r/g   - Respiratory: clear to auscultation bilaterally   - Integumentary: skin is normal, no edema  - Neurological: reflexes 2+ at biceps, no tremors  - Psychiatric: normal mood and affect    Data Reviewed:   Component      Latest Ref Rng & Units 4/17/2019 4/17/2019           8:16 AM  8:16 AM   T4, Free      0.82 - 1.77 ng/dL  1.42   TSH      0.450 - 4.500 uIU/mL 2.070        Assessment/Plan:     1. Hashimoto's disease: Was diagnosed about 2014 and was initially put on levothyroxine by [facility] but decided to be seen by [address] [place] [address] Road and was tried on rabbit thyroid drops but still didn't feel well and Dr. Sarita Charles recommended taking a pill instead and since she wanted to first stay on the natural thyroid hormone, he prescribed armour thyroid and initially was on 180 mg daily and TSH was 0.05 in 12/16 and her dose was cut back to 150 mg daily and TSH was still low at 0.034 in 3/17. Testosterone was low at < 3 in 3/17 and was started on pellet implantation at that time. Thinks her armour was then cut back to 120 mg daily and TSH was up to 0.247 in 5/17 and testosterone was 220. Then TSH was back to normal at 1.14 and FT4 was low at 0.7 in 6/17 and dose was kept the same and most recently in 9/17, TSH was 0.905 and FT4 0.73 and T was 283 and hasn't had further dose changes. Did have her 2nd pellet insertion in 8/17. Takes the armour about 90 minutes after dinner. TSH 1.28 and TPO ab 134 in 12/17 at her first visit with me confirming Hashimoto's. I kept her dose the same and given she was having daytime fatigue and some insomnia, advised her to move the armour to the morning to see if this helps with energy. TSH 0.52 and T3 182 and FT4 0.77 and TPO ab 110 in 4/18. She wanted to try synthetic so changed to unithroid.   120 of armour = 200 of unithroid but I was concerned this dose would be too much based on her body weight and already has some fluttering so used 175 mcg of unithroid as a starting dose with 88 mcg tabs to take 2 tabs daily for the next 5 weeks and TSH 0.139 and FT4 2.04 in 5/18 so cut back to 150 mcg daily. Lost 3 lbs and TSH still low at 0.10 and FT4 1.78 in 10/18 so decreased to 137 mcg daily. TSH 0.25 in 12/18 and decreased to 6.5 tabs/week but TSH up to 3.02 in 2/19 and wt up 9 lbs so increased back to 1 tab daily and TSH 2.07 in 4/19. Still having afternoon fatigue so decreased unithroid to 6 tabs/week and added cytomel 5 mcg daily  - begin cytomel 5 mcg daily at lunch  - decrease unithroid to 137 1 tab on Mon-Sat and none on Sun  - check TSH and free T4 and total T3 in 6 weeks and prior to next visit       Patient Instructions   1) TSH is a thyroid test.  Your level is 2.0 which is normal and at goal of 0.5-2.0. This test goes opposite of your thyroid dose and suggests your dose of unithroid is working well but we will try taking 1 tab on Mon-Sat and none on Sunday and instead add cytomel 5 mcg once daily at lunch to help with afternoon fatiigue as this will give you T3 which is the active thyroid hormone and some patients feel better having both T4 and T3 in their system. I will send this to [facility] and start this plan when this arrives. 2) If you are having a hard time remembering the cytomel at lunch, it's okay to take first thing in the morning. 3) Repeat your labs in 6 weeks to see the effect of this dose. Follow-up and Dispositions    · Return in about 6 months (around 10/26/2019).                Copy sent to:  Dr. Irvin Crocker    Lab follow up: 5/29/19  Component      Latest Ref Rng & Units 5/28/2019 5/28/2019 5/28/2019          10:39 AM 10:39 AM 10:39 AM   T4, Free      0.82 - 1.77 ng/dL   1.19   TSH      0.450 - 4.500 uIU/mL  1.740    T3, total      71 - 180 ng/dL 113       Sent her the following message through MyChart:  TSH is a thyroid test.  Your level is 1.74 which is normal and at goal of 0.5-2.0. This test goes opposite of your thyroid dose and suggests your dose of unithroid and cytomel is working well so I will keep your dose the same.

## 2019-04-26 NOTE — PATIENT INSTRUCTIONS
1) TSH is a thyroid test.  Your level is 2.0 which is normal and at goal of 0.5-2.0. This test goes opposite of your thyroid dose and suggests your dose of unithroid is working well but we will try taking 1 tab on Mon-Sat and none on Sunday and instead add cytomel 5 mcg once daily at lunch to help with afternoon fatiigue as this will give you T3 which is the active thyroid hormone and some patients feel better having both T4 and T3 in their system. I will send this to [facility] and start this plan when this arrives. 2) If you are having a hard time remembering the cytomel at lunch, it's okay to take first thing in the morning. 3) Repeat your labs in 6 weeks to see the effect of this dose.

## 2019-04-30 ENCOUNTER — HOSPITAL ENCOUNTER (OUTPATIENT)
Dept: MAMMOGRAPHY | Age: 54
Discharge: HOME | End: 2019-04-30
Attending: SPECIALIST
Payer: COMMERCIAL

## 2019-04-30 DIAGNOSIS — Z12.39 BREAST SCREENING, UNSPECIFIED: ICD-10-CM

## 2019-04-30 PROCEDURE — 77067 SCR MAMMO BI INCL CAD: CPT

## 2019-05-29 LAB
T3 SERPL-MCNC: 113 NG/DL (ref 71–180)
T4 FREE SERPL-MCNC: 1.19 NG/DL (ref 0.82–1.77)
TSH SERPL DL<=0.005 MIU/L-ACNC: 1.74 UIU/ML (ref 0.45–4.5)

## 2019-08-24 RX ORDER — LEVOTHYROXINE SODIUM 137 UG/1
TABLET ORAL
Qty: 90 TAB | Refills: 3 | Status: SHIPPED | OUTPATIENT
Start: 2019-08-24 | End: 2019-10-28

## 2019-10-04 LAB
T3 SERPL-MCNC: 93 NG/DL (ref 71–180)
T4 FREE SERPL-MCNC: 1.36 NG/DL (ref 0.82–1.77)
TSH SERPL DL<=0.005 MIU/L-ACNC: 2.18 UIU/ML (ref 0.45–4.5)

## 2019-10-28 ENCOUNTER — OFFICE VISIT (OUTPATIENT)
Dept: ENDOCRINOLOGY | Age: 54
End: 2019-10-28

## 2019-10-28 VITALS
HEIGHT: 65 IN | DIASTOLIC BLOOD PRESSURE: 70 MMHG | SYSTOLIC BLOOD PRESSURE: 110 MMHG | WEIGHT: 142.2 LBS | OXYGEN SATURATION: 99 % | HEART RATE: 60 BPM | BODY MASS INDEX: 23.69 KG/M2 | RESPIRATION RATE: 16 BRPM | TEMPERATURE: 99.2 F

## 2019-10-28 DIAGNOSIS — E06.3 HASHIMOTO'S DISEASE: Primary | ICD-10-CM

## 2019-10-28 RX ORDER — LEVOTHYROXINE SODIUM 137 UG/1
TABLET ORAL
Qty: 90 TAB | Refills: 3 | Status: SHIPPED | OUTPATIENT
Start: 2019-10-28 | End: 2020-10-18

## 2019-10-28 NOTE — PROGRESS NOTES
Chief Complaint   Patient presents with    Thyroid Problem     F/u     History of Present Illness: Kathya Ware is a 48 y.o. female here for follow up of thyroid. Weight down 5 lbs since last visit in 4/19. Has noticed her fatigue is better with starting the cytomel at lunch and is still taking unithroid at 2-3am 6 days a week but recently is has worsened. Bowels are regular. Still tends to stay cold and unchanged. No hair loss or brittle nails. Some dry skin but nothing significant. Hasn't missed any doses or run out. May still have occ palpitations and is wondering if her iron is still low as she was denied ability to give blood earlier this month. Current Outpatient Medications   Medication Sig    OREGANO OIL PO Take  by mouth.  UNITHROID 137 mcg tablet Take 1 tab on Mon-Sat and NONE on Sunday--BRAND MEDICALLY NECESSARY    liothyronine (CYTOMEL) 5 mcg tablet Take 1 tab daily at lunch    OTHER Black seed oil 1 per day    drospirenone-e.estradiol-lm. FA 3-0.02-0.451 mg (24) (4) tab Take  by mouth daily.  testosterone 75 mg pllt by Implant route. Every 5-6 months from Dr. Tori Manrique b complex vitamins (B COMPLEX 1) tablet Take 1 Tab by mouth daily.  cholecalciferol, vitamin d3, 10,000 unit cap Take  by mouth daily.  HOP-BLK COH-RHODIOLA-FLAX-PRIM PO Take  by mouth daily. For energy    zinc 50 mg tab tablet Take  by mouth daily.  POTASSIUM/MAGNESIUM (MAGNESIUM-POTASSIUM PO) Take  by mouth daily.  TURMERIC, BULK, by Does Not Apply route as needed. No current facility-administered medications for this visit. No Known Allergies     Review of Systems:  - Cardiovascular: no chest pain  - Neurological: no tremors  - Integumentary: skin is normal    Physical Examination:  Blood pressure 110/70, pulse 60, temperature 99.2 °F (37.3 °C), temperature source Oral, resp. rate 16, height 5' 5\" (1.651 m), weight 142 lb 3.2 oz (64.5 kg), SpO2 99 %.   - General: pleasant, no distress, good eye contact   - Neck: small goiter  - Cardiovascular: regular, normal rate, nl s1 and s2, no m/r/g   - Respiratory: clear to auscultation bilaterally   - Integumentary: skin is normal, no edema  - Neurological: reflexes 2+ at biceps, no tremors  - Psychiatric: normal mood and affect    Data Reviewed:   Component      Latest Ref Rng & Units 10/3/2019 10/3/2019 10/3/2019          12:00 AM 12:00 AM 12:00 AM   TSH      0.450 - 4.500 uIU/mL   2.180   T4, Free      0.82 - 1.77 ng/dL  1.36    T3, total      71 - 180 ng/dL 93       Assessment/Plan:     1. Hashimoto's disease: Was diagnosed about 2014 and was initially put on levothyroxine by [facility] but decided to be seen by [address] [place] [address] and was tried on rabbit thyroid drops but still didn't feel well and Dr. Juan Manuel Griffin recommended taking a pill instead and since she wanted to first stay on the natural thyroid hormone, he prescribed armour thyroid and initially was on 180 mg daily and TSH was 0.05 in 12/16 and her dose was cut back to 150 mg daily and TSH was still low at 0.034 in 3/17. Testosterone was low at < 3 in 3/17 and was started on pellet implantation at that time. Thinks her armour was then cut back to 120 mg daily and TSH was up to 0.247 in 5/17 and testosterone was 220. Then TSH was back to normal at 1.14 and FT4 was low at 0.7 in 6/17 and dose was kept the same and most recently in 9/17, TSH was 0.905 and FT4 0.73 and T was 283 and hasn't had further dose changes. Did have her 2nd pellet insertion in 8/17. Takes the armour about 90 minutes after dinner. TSH 1.28 and TPO ab 134 in 12/17 at her first visit with me confirming Hashimoto's. I kept her dose the same and given she was having daytime fatigue and some insomnia, advised her to move the armour to the morning to see if this helps with energy. TSH 0.52 and T3 182 and FT4 0.77 and TPO ab 110 in 4/18. She wanted to try synthetic so changed to unithroid.   120 of armour = 200 of unithroid but I was concerned this dose would be too much based on her body weight and already has some fluttering so used 175 mcg of unithroid as a starting dose with 88 mcg tabs to take 2 tabs daily for the next 5 weeks and TSH 0.139 and FT4 2.04 in 5/18 so cut back to 150 mcg daily. Lost 3 lbs and TSH still low at 0.10 and FT4 1.78 in 10/18 so decreased to 137 mcg daily. TSH 0.25 in 12/18 and decreased to 6.5 tabs/week but TSH up to 3.02 in 2/19 and wt up 9 lbs so increased back to 1 tab daily and TSH 2.07 in 4/19. Still having afternoon fatigue so decreased unithroid to 6 tabs/week and added cytomel 5 mcg daily and TSH 1.74 in 5/19 so kept dose the same. TSH 2.18 in 10/19 so increased unithroid back to 6.5 tabs/week  - cont cytomel 5 mcg daily at lunch  - increase unithroid to 137 1 tab on Mon-Sat and 1/2 tab on Sun  - check TSH and free T4 and total T3 in 2 months and prior to next visit       Patient Instructions   1) TSH is a thyroid test.  Your level is 2.1 which is normal but above goal of 0.5-2.0. This test goes opposite of your thyroid dose and suggests your dose of unithroid is not quite enough. I will increase your dose to 1 tab on Mon-Sat and 1/2 tab on Sun and have sent a new prescription to your local pharmacy. Keep taking 1 tab of cytomel (liothyronine) at lunch. Tomorrow only take 1.5 tabs to get caught up. 2) Repeat your labs in 2 months and I'll e-mail you the results. 3) Download the [employer] chalino from the chalino store ([employer]) or Google play store ([employer]ve) (make sure you allow locations and choose the [facility] portal and choose to receive notifications) so you can communicate with me through the patient portal.  I will send you a message with your lab results. Follow-up and Dispositions    · Return in about 6 months (around 4/28/2020).                Copy sent to:  Dr. Maverick Sam MD    Lab follow up: 12/28/19  Component      Latest Ref Rng & Units 12/27/2019 12/27/2019 12/27/2019          11:57 AM 11:57 AM 11:57 AM   T4, Free      0.82 - 1.77 ng/dL   1.34   TSH      0.450 - 4.500 uIU/mL  0.966    T3, total      71 - 180 ng/dL 99       Sent her the following message through [employer]:  TSH is a thyroid test.  Your level is 0.96 which is normal and at goal of 0.5-2.0. This test goes opposite of your thyroid dose and suggests your dose of unithroid and cytomel is perfect so I will keep your dose the same.

## 2019-10-28 NOTE — PROGRESS NOTES
Identified pt with two pt identifiers(name and ). Reviewed record in preparation for visit and have obtained necessary documentation. Chief Complaint   Patient presents with    Thyroid Problem     F/u        Visit Vitals  /70 (BP 1 Location: Left arm, BP Patient Position: Sitting)   Pulse 60   Temp 99.2 °F (37.3 °C) (Oral)   Resp 16   Ht 5' 5\" (1.651 m)   Wt 142 lb 3.2 oz (64.5 kg)   SpO2 99%   BMI 23.66 kg/m²       Pain Scale: 0 - No pain/10  Pain Location:     1) Have you been to an emergency room, urgent care, or hospitalized since your last visit? If yes, where when, and reason for visit? no       2. Have seen or consulted any other health care provider since your last visit? If yes, where when, and reason for visit?   NO

## 2019-10-28 NOTE — PATIENT INSTRUCTIONS
1) TSH is a thyroid test.  Your level is 2.1 which is normal but above goal of 0.5-2.0. This test goes opposite of your thyroid dose and suggests your dose of unithroid is not quite enough. I will increase your dose to 1 tab on Mon-Sat and 1/2 tab on Sun and have sent a new prescription to your local pharmacy. Keep taking 1 tab of cytomel (liothyronine) at lunch. Tomorrow only take 1.5 tabs to get caught up. 2) Repeat your labs in 2 months and I'll e-mail you the results. 3) Download the [employer] chalino from the chalino store ([employer]) or Google play store (android) (make sure you allow locations and choose the [employer] portal and choose to receive notifications) so you can communicate with me through the patient portal.  I will send you a message with your lab results.

## 2019-12-28 DIAGNOSIS — E06.3 HASHIMOTO'S DISEASE: ICD-10-CM

## 2019-12-28 LAB
T3 SERPL-MCNC: 99 NG/DL (ref 71–180)
T4 FREE SERPL-MCNC: 1.34 NG/DL (ref 0.82–1.77)
TSH SERPL DL<=0.005 MIU/L-ACNC: 0.97 UIU/ML (ref 0.45–4.5)

## 2020-02-23 RX ORDER — LIOTHYRONINE SODIUM 5 UG/1
TABLET ORAL
Qty: 90 TAB | Refills: 3 | Status: SHIPPED | OUTPATIENT
Start: 2020-02-23 | End: 2021-01-14 | Stop reason: SDUPTHER

## 2020-04-14 DIAGNOSIS — E06.3 HASHIMOTO'S DISEASE: ICD-10-CM

## 2020-04-16 LAB
T3 SERPL-MCNC: 82 NG/DL (ref 71–180)
T4 FREE SERPL-MCNC: 1.4 NG/DL (ref 0.82–1.77)
TSH SERPL DL<=0.005 MIU/L-ACNC: 0.51 UIU/ML (ref 0.45–4.5)

## 2020-04-28 ENCOUNTER — OFFICE VISIT (OUTPATIENT)
Dept: ENDOCRINOLOGY | Age: 55
End: 2020-04-28

## 2020-04-28 DIAGNOSIS — E06.3 HASHIMOTO'S DISEASE: Primary | ICD-10-CM

## 2020-04-28 RX ORDER — METFORMIN HYDROCHLORIDE 500 MG/1
1000 TABLET ORAL DAILY
COMMUNITY

## 2020-04-28 RX ORDER — METFORMIN HYDROCHLORIDE 500 MG/1
TABLET ORAL DAILY
COMMUNITY
Start: 2020-04-17 | End: 2020-11-03

## 2020-04-28 NOTE — PROGRESS NOTES
Chief Complaint   Patient presents with    Thyroid Problem     pcp and kalie augustin       **THIS IS A VIRTUAL VISIT VIA A VIDEO SYNCHRONOUS DISCUSSION. PATIENT AGREED TO HAVE THEIR CARE DELIVERED OVER A [employer]. ME VIDEO VISIT IN PLACE OF THEIR REGULARLY SCHEDULED OFFICE VISIT**    History of Present Illness: Amber Espinosa is a 48 y.o. female here for follow up of thyroid. Her weight is down to 135 lbs which is about 7 lbs down since last visit in 10/19. Has been compliant with unithroid and cytomel and energy has been better but has had more palpitations at bedtime recently. No chest pain, shortness of breath or tremors. Bowels are regular to slightly constipated. No hair loss. Tends to stay cold. Current Outpatient Medications   Medication Sig    OTHER,NON-FORMULARY, Black see oil po daily    metFORMIN (GLUCOPHAGE) 500 mg tablet daily.  metFORMIN (GLUCOPHAGE) 500 mg tablet Take  by mouth daily.  cyanocobalamin, vitamin B-12, (VITAMIN B-12 PO) Take  by mouth.  OTHER,NON-FORMULARY, [place]odiola po daily    liothyronine (CYTOMEL) 5 mcg tablet TAKE 1 TABLET BY MOUTH  DAILY AT LUNCH    OREGANO OIL PO Take  by mouth.  UNITHROID 137 mcg tablet Take 1 tab on Mon-Sat and 1/2 tab on Sunday--BRAND MEDICALLY NECESSARY    OTHER Black seed oil 1 per day    drospirenone-e.estradiol-lm. FA 3-0.02-0.451 mg (24) (4) tab Take  by mouth daily.  testosterone 75 mg pllt by Implant route. Every 5-6 months from Dr. Barbara Garcia zinc 50 mg tab tablet Take  by mouth daily.  POTASSIUM/MAGNESIUM (MAGNESIUM-POTASSIUM PO) Take  by mouth daily.  TURMERIC, BULK, by Does Not Apply route as needed. No current facility-administered medications for this visit.       No Known Allergies     Review of Systems: PER HPI    Physical Examination:  - GENERAL: NCAT, Appears well nourished   - EYES: EOMI, non-icteric, no proptosis   - Ear/Nose/Throat: NCAT, no visible inflammation or masses   - CARDIOVASCULAR: no cyanosis, no visible JVD   - RESPIRATORY: respiratory effort normal without any distress or labored breathing   - MUSCULOSKELETAL: Normal ROM of neck and upper extremities observed   - SKIN: No rash on face  - NEUROLOGIC:  No facial asymmetry (Cranial nerve 7 motor function), No gaze palsy   - PSYCHIATRIC: Normal affect, Normal insight and judgement       Data Reviewed:   Component      Latest Ref Rng & Units 4/15/2020 4/15/2020 4/15/2020          12:00 PM 12:00 PM 12:00 PM   T4, Free      0.82 - 1.77 ng/dL   1.40   TSH      0.450 - 4.500 uIU/mL  0.506    T3, total      71 - 180 ng/dL 82         Assessment/Plan:     1. Hashimoto's disease: Was diagnosed about 2014 and was initially put on levothyroxine by [facility] but decided to be seen by [address] [place] [address] and was tried on rabbit thyroid drops but still didn't feel well and Dr. Portillo Zavaleta recommended taking a pill instead and since she wanted to first stay on the natural thyroid hormone, he prescribed armour thyroid and initially was on 180 mg daily and TSH was 0.05 in 12/16 and her dose was cut back to 150 mg daily and TSH was still low at 0.034 in 3/17. Testosterone was low at < 3 in 3/17 and was started on pellet implantation at that time. Thinks her armour was then cut back to 120 mg daily and TSH was up to 0.247 in 5/17 and testosterone was 220. Then TSH was back to normal at 1.14 and FT4 was low at 0.7 in 6/17 and dose was kept the same and most recently in 9/17, TSH was 0.905 and FT4 0.73 and T was 283 and hasn't had further dose changes. Did have her 2nd pellet insertion in 8/17. Takes the armour about 90 minutes after dinner. TSH 1.28 and TPO ab 134 in 12/17 at her first visit with me confirming Hashimoto's. I kept her dose the same and given she was having daytime fatigue and some insomnia, advised her to move the armour to the morning to see if this helps with energy. TSH 0.52 and T3 182 and FT4 0.77 and TPO ab 110 in 4/18. She wanted to try synthetic so changed to unithroid. 120 of armour = 200 of unithroid but I was concerned this dose would be too much based on her body weight and already has some fluttering so used 175 mcg of unithroid as a starting dose with 88 mcg tabs to take 2 tabs daily for the next 5 weeks and TSH 0.139 and FT4 2.04 in 5/18 so cut back to 150 mcg daily. Lost 3 lbs and TSH still low at 0.10 and FT4 1.78 in 10/18 so decreased to 137 mcg daily. TSH 0.25 in 12/18 and decreased to 6.5 tabs/week but TSH up to 3.02 in 2/19 and wt up 9 lbs so increased back to 1 tab daily and TSH 2.07 in 4/19. Still having afternoon fatigue so decreased unithroid to 6 tabs/week and added cytomel 5 mcg daily and TSH 1.74 in 5/19 so kept dose the same. TSH 2.18 in 10/19 so increased unithroid back to 6.5 tabs/week and TSH 0.96 in 12/19 so kept dose the same. TSH down to 0.50 in 4/20 but having more palpitation so went back to 6 tabs/week as wt down 7 lbs. - cont cytomel 5 mcg daily at lunch  - decrease unithroid to 137 to 1 tab on Mon-Sat and none on Sun  - check TSH and free T4 and total T3 prior to next visit       Patient Instructions   1) Your TSH (thyroid test) is at the low end of normal but since you are having palpitations, please decrease your unithroid back to 1 tab on Mon-Sat and none on Sunday. Tomorrow just take 1/2 tab. Keep taking cytomel 1 tab 7 days a week. 2) Please come for a follow up visit on 11/3/20 at 3:30pm in our [place] office. 3) I will mail you a lab slip. Please put this in the glove compartment or other safe spot where you keep your medical papers and I will send you a reminder to have your labs drawn prior to next visit. 4) If you feel you need your labs checked sooner, just let me know.     Follow-up and Dispositions    · Return for 11/3/20 at 3:30pm.               Copy sent to:  Dr. Neil Quinones MD    Lab follow up: 7/9/20  Component      Latest Ref Rng & Units 7/2/2020 7/2/2020 7/2/2020          12:00 AM 12:00 AM 12:00 AM   T4, Free      0.82 - 1.77 ng/dL   1.36   TSH      0.450 - 4.500 uIU/mL  0.495    T3, total      71 - 180 ng/dL 99       Sent her the following message through [employer]:  Your labs are pretty stable from last time and since you said you are no longer having palpitations, I don't think the thyroid is to blame for your fatigue. If you feel you need your iron evaluated, I will defer this to your PCP. Even though you don't have the lab slip any longer it turns out that the order that I have for November is still in the [address] [place] [address] [employer] system so you won't need an additional lab slip mailed to you and you can just give [facility] my name and they can pull the order when you go a few days prior to your next visit.

## 2020-04-28 NOTE — PATIENT INSTRUCTIONS
1) Your TSH (thyroid test) is at the low end of normal but since you are having palpitations, please decrease your unithroid back to 1 tab on Mon-Sat and none on Sunday. Tomorrow just take 1/2 tab. Keep taking cytomel 1 tab 7 days a week. 2) Please come for a follow up visit on 11/3/20 at 3:30pm in our [place] office. 3) I will mail you a lab slip. Please put this in the glove compartment or other safe spot where you keep your medical papers and I will send you a reminder to have your labs drawn prior to next visit. 4) If you feel you need your labs checked sooner, just let me know.

## 2020-04-28 NOTE — LETTER
5/12/2020 6:21 PM 
 
Ms. Jada Falcon [address] [place] [address] Since you haven't read the message I sent you on 4/28/20 through [address], I wanted to send you a letter with the message: 
 
1) Your TSH (thyroid test) is at the low end of normal but since you are having palpitations, please decrease your unithroid back to 1 tab on Mon-Sat and none on Sunday.  Tomorrow just take 1/2 tab.  Keep taking cytomel 1 tab 7 days a week. 2) Please come for a follow up visit on 11/3/20 at 3:30pm in our [place] office. 3) I will mail you a lab slip (already done).  Please put this in the glove compartment or other safe spot where you keep your medical papers and I will send you a reminder to have your labs drawn prior to next visit. 4) If you feel you need your labs checked sooner, just let me know.   
 
 
 
 
 
 
Sincerely, 
 
 
Jerrell Pierce MD

## 2020-05-06 ENCOUNTER — HOSPITAL ENCOUNTER (OUTPATIENT)
Dept: MAMMOGRAPHY | Age: 55
Discharge: HOME | End: 2020-05-06
Attending: SPECIALIST
Payer: COMMERCIAL

## 2020-05-06 DIAGNOSIS — Z12.31 VISIT FOR SCREENING MAMMOGRAM: ICD-10-CM

## 2020-05-06 PROCEDURE — 77067 SCR MAMMO BI INCL CAD: CPT

## 2020-05-12 ENCOUNTER — HOSPITAL ENCOUNTER (OUTPATIENT)
Dept: MAMMOGRAPHY | Age: 55
Discharge: HOME | End: 2020-05-12
Attending: SPECIALIST
Payer: COMMERCIAL

## 2020-05-12 ENCOUNTER — HOSPITAL ENCOUNTER (OUTPATIENT)
Dept: ULTRASOUND IMAGING | Age: 55
Discharge: HOME | End: 2020-05-12
Attending: SPECIALIST
Payer: COMMERCIAL

## 2020-05-12 DIAGNOSIS — R92.8 ABNORMAL MAMMOGRAM: ICD-10-CM

## 2020-05-12 PROCEDURE — 77066 DX MAMMO INCL CAD BI: CPT

## 2020-05-12 PROCEDURE — 76642 ULTRASOUND BREAST LIMITED: CPT

## 2020-07-03 LAB
T3 SERPL-MCNC: 99 NG/DL (ref 71–180)
T4 FREE SERPL-MCNC: 1.36 NG/DL (ref 0.82–1.77)
TSH SERPL DL<=0.005 MIU/L-ACNC: 0.49 UIU/ML (ref 0.45–4.5)

## 2020-10-17 DIAGNOSIS — E06.3 HASHIMOTO'S DISEASE: ICD-10-CM

## 2020-10-17 LAB
T3 SERPL-MCNC: 93 NG/DL (ref 71–180)
T4 FREE SERPL-MCNC: 1.43 NG/DL (ref 0.82–1.77)
TSH SERPL DL<=0.005 MIU/L-ACNC: 0.97 UIU/ML (ref 0.45–4.5)

## 2020-10-18 RX ORDER — LEVOTHYROXINE SODIUM 137 UG/1
TABLET ORAL
Qty: 85 TAB | Refills: 3 | Status: SHIPPED | OUTPATIENT
Start: 2020-10-18 | End: 2020-11-03

## 2020-11-03 ENCOUNTER — VIRTUAL VISIT (OUTPATIENT)
Dept: ENDOCRINOLOGY | Age: 55
End: 2020-11-03
Payer: COMMERCIAL

## 2020-11-03 DIAGNOSIS — E06.3 HASHIMOTO'S DISEASE: Primary | ICD-10-CM

## 2020-11-03 PROCEDURE — 99213 OFFICE O/P EST LOW 20 MIN: CPT | Performed by: INTERNAL MEDICINE

## 2020-11-03 RX ORDER — LEVOTHYROXINE SODIUM 137 UG/1
TABLET ORAL
Qty: 85 TAB | Refills: 3
Start: 2020-11-03 | End: 2020-12-19

## 2020-11-03 RX ORDER — HYDROXYCHLOROQUINE SULFATE 200 MG/1
200 TABLET, FILM COATED ORAL 2 TIMES DAILY
COMMUNITY
End: 2021-11-02

## 2020-11-03 NOTE — PROGRESS NOTES
Chief Complaint   Patient presents with    Thyroid Problem     pcp and pharmacy confirmed    Other     [phone] doxy/iphone       **THIS IS A VIRTUAL VISIT VIA A VIDEO SYNCHRONOUS DISCUSSION. PATIENT AGREED TO HAVE THEIR CARE DELIVERED OVER A [facility]. ME VIDEO VISIT IN PLACE OF THEIR REGULARLY SCHEDULED OFFICE VISIT**    History of Present Illness: Laura Scott is a 50 y.o. female here for follow up of thyroid. Compliant with unithroid 6 tabs/week at 3am and the cytomel is at lunch and may get a rare palpitation if she gets nervous but not on a regular basis. No chest pain or tremors. Bowels are constipated but unchanged. No hair loss or brittle nails aside from on her toes. Tends to feel cold but unchanged, especially in her hands. Weight is 130 currently down from 142 in 10/19. Current Outpatient Medications   Medication Sig    hydrOXYchloroQUINE (Plaquenil) 200 mg tablet Take 200 mg by mouth two (2) times a day.  flaxseed oil (OMEGA 3 PO) Take  by mouth daily.  OTHER,NON-FORMULARY, Berberine one tab tid    OTHER,NON-FORMULARY, apha lipoic acid tablets tid    Unithroid 137 mcg tablet TAKE 1 TABLET BY MOUTH ON  MONDAY TO SATURDAY AND NONE ON Sunday--Dose change 11/3/20--updated med list--did not send prescription to the pharmacy   Abhilash Sampson see oil po daily    metFORMIN (GLUCOPHAGE) 500 mg tablet Take  by mouth daily.  cyanocobalamin, vitamin B-12, (VITAMIN B-12 PO) Take  by mouth daily.  OTHER,NON-FORMULARY, Rhodiola po daily    liothyronine (CYTOMEL) 5 mcg tablet TAKE 1 TABLET BY MOUTH  DAILY AT LUNCH    OTHER Black seed oil 1 per day    drospirenone-e.estradiol-lm. FA 3-0.02-0.451 mg (24) (4) tab Take  by mouth daily.  testosterone 75 mg pllt by Implant route. Every 5-6 months from Dr. Moreno Knowles (MAGNESIUM-POTASSIUM PO) Take  by mouth daily.  TURMERIC, BULK, by Does Not Apply route as needed.      No current facility-administered medications for this visit. No Known Allergies     Review of Systems: PER HPI    Physical Examination:  - GENERAL: NCAT, Appears well nourished   - EYES: EOMI, non-icteric, no proptosis   - Ear/Nose/Throat: NCAT, no visible inflammation or masses   - CARDIOVASCULAR: no cyanosis, no visible JVD   - RESPIRATORY: respiratory effort normal without any distress or labored breathing   - MUSCULOSKELETAL: Normal ROM of neck and upper extremities observed   - SKIN: No rash on face  - NEUROLOGIC:  No facial asymmetry (Cranial nerve 7 motor function), No gaze palsy   - PSYCHIATRIC: Normal affect, Normal insight and judgement       Data Reviewed:   Component      Latest Ref Rng & Units 10/16/2020 10/16/2020 10/16/2020           4:04 PM  4:04 PM  4:04 PM   T4, Free      0.82 - 1.77 ng/dL   1.43   TSH      0.450 - 4.500 uIU/mL  0.975    T3, total      71 - 180 ng/dL 93         Assessment/Plan:     1. Hashimoto's disease: Was diagnosed about 2014 and was initially put on levothyroxine by [facility] but decided to be seen by [address] [place] [address] and was tried on rabbit thyroid drops but still didn't feel well and Dr. Naomi Leslie recommended taking a pill instead and since she wanted to first stay on the natural thyroid hormone, he prescribed armour thyroid and initially was on 180 mg daily and TSH was 0.05 in 12/16 and her dose was cut back to 150 mg daily and TSH was still low at 0.034 in 3/17. Testosterone was low at < 3 in 3/17 and was started on pellet implantation at that time. Thinks her armour was then cut back to 120 mg daily and TSH was up to 0.247 in 5/17 and testosterone was 220. Then TSH was back to normal at 1.14 and FT4 was low at 0.7 in 6/17 and dose was kept the same and most recently in 9/17, TSH was 0.905 and FT4 0.73 and T was 283 and hasn't had further dose changes. Did have her 2nd pellet insertion in 8/17. Takes the armour about 90 minutes after dinner.   TSH 1.28 and TPO ab 134 in 12/17 at her first visit with me confirming Hashimoto's. I kept her dose the same and given she was having daytime fatigue and some insomnia, advised her to move the armour to the morning to see if this helps with energy. TSH 0.52 and T3 182 and FT4 0.77 and TPO ab 110 in 4/18. She wanted to try synthetic so changed to unithroid. 120 of armour = 200 of unithroid but I was concerned this dose would be too much based on her body weight and already has some fluttering so used 175 mcg of unithroid as a starting dose with 88 mcg tabs to take 2 tabs daily for the next 5 weeks and TSH 0.139 and FT4 2.04 in 5/18 so cut back to 150 mcg daily. Lost 3 lbs and TSH still low at 0.10 and FT4 1.78 in 10/18 so decreased to 137 mcg daily. TSH 0.25 in 12/18 and decreased to 6.5 tabs/week but TSH up to 3.02 in 2/19 and wt up 9 lbs so increased back to 1 tab daily and TSH 2.07 in 4/19. Still having afternoon fatigue so decreased unithroid to 6 tabs/week and added cytomel 5 mcg daily and TSH 1.74 in 5/19 so kept dose the same. TSH 2.18 in 10/19 so increased unithroid back to 6.5 tabs/week and TSH 0.96 in 12/19 so kept dose the same. TSH down to 0.50 in 4/20 but having more palpitations so went back to 6 tabs/week as wt down 7 lbs and TSH 0.49 in 7/20 and 0.97 in 10/20 so kept dose the same. - cont cytomel 5 mcg daily at lunch  - cont unithroid 137 1 tab on Mon-Sat and none on Sun  - check TSH and free T4 and total T3 prior to next visit       Patient Instructions   1) Your TSH is a thyroid test.  Your level is 0.97 which is normal and at goal of 0.45-2.0. This test goes opposite of your thyroid dose and suggests your dose of unithroid and cytomel is perfect so I will keep your dose the same. 2) I will plan on seeing you back in one year but if you feel you need to have your labs checked sooner, please let me know. 3) Please come for a follow up visit on 11/2/21 at 9:50am in our [place] office.     4) I put an order directly into the [facility] system to repeat your labs in the 1-2 weeks prior to your next visit so just ask for the order under my name and you will receive a courtesy reminder through [employer] to have these drawn. Follow-up and Dispositions    · Return 11/2/21 at 9:50am.               Copy sent to:  Dr. Maylin Kaye MD    Lab follow up: 12/19/20  She wrote that her TSH was 2.56 and Free T4 was 1.18 on 12/1/20 at Dr. Jyothi Yan office and wanted to repeat her labs to see if this was a fluke or not. Component      Latest Ref Rng & Units 12/18/2020 12/18/2020 12/18/2020           8:19 AM  8:19 AM  8:19 AM   T4, Free      0.82 - 1.77 ng/dL   1.47   TSH      0.450 - 4.500 uIU/mL  1.870    T3, total      71 - 180 ng/dL 103       Sent her the following message through [employer]:  TSH is a thyroid test.  Your level is 1.87 which is normal and at goal of 0.45-2.0 and improved from 2.56 on 12/1/20 with Dr. Fanny Jose. This test goes opposite of your thyroid dose and suggests your dose of unithroid appears to be adequate. However, since your level has trended up from 0.97 and you are having a little more fatigue, if you want to try taking 1 tab on Mon-Sat and 1/2 tab on Sunday, you are welcome to do so. In 4/20, you were taking this dose but had more palpitations so we went back to 6 tabs/week. Let me know if you want to stay on 6 tabs/week or go back to 6.5 tabs/week. Addendum: 12/19/20    We had the following [employer] exchange:    Sounds good. I'll update your med list with this change.   Your free T4 varies based on the time of day your lab was drawn and will be higher when drawn earlier in the morning closer to when your dose of unithroid was taken and will drift down as the day goes on.    ===View-only below this line===      ----- Message -----       From:Aga Holguin       Sent:12/19/2020  9:21 AM EST         To:Damir Miller MD    Subject:RE:lab results    I think I will add a half a tablet on Sunday just to see if it will help and if I start getting heart  Palpitations, I can always drop the half pill. I have been on this medication range for a while, so I am comfortable feeling my levels. It is just weird that my levels are changing when they have been constant for a while. I believe I am fighting some major inflammation issues and wonder if my body is responding through my tsh. I feel my best when TSH is just below 1.0. I guess I dont understand if my tsh increased, why my T4 increased. That is a bit perplexing.

## 2020-11-03 NOTE — PATIENT INSTRUCTIONS
1) Your TSH is a thyroid test.  Your level is 0.97 which is normal and at goal of 0.45-2.0. This test goes opposite of your thyroid dose and suggests your dose of unithroid and cytomel is perfect so I will keep your dose the same. 2) I will plan on seeing you back in one year but if you feel you need to have your labs checked sooner, please let me know. 3) Please come for a follow up visit on 11/2/21 at 9:50am in our [place] office. 4) I put an order directly into the [facility] system to repeat your labs in the 1-2 weeks prior to your next visit so just ask for the order under my name and you will receive a courtesy reminder through [employer] to have these drawn.

## 2020-11-24 ENCOUNTER — TRANSCRIBE ORDER (OUTPATIENT)
Dept: SCHEDULING | Age: 55
End: 2020-11-24

## 2020-11-24 DIAGNOSIS — N63.0 LUMP OR MASS IN BREAST: Primary | ICD-10-CM

## 2020-11-25 ENCOUNTER — TRANSCRIBE ORDER (OUTPATIENT)
Dept: SCHEDULING | Age: 55
End: 2020-11-25

## 2020-11-25 DIAGNOSIS — N63.0 LUMP OR MASS IN BREAST: Primary | ICD-10-CM

## 2020-12-11 ENCOUNTER — HOSPITAL ENCOUNTER (OUTPATIENT)
Dept: MAMMOGRAPHY | Age: 55
Discharge: HOME | End: 2020-12-11
Attending: OBSTETRICS & GYNECOLOGY
Payer: COMMERCIAL

## 2020-12-11 DIAGNOSIS — N63.0 LUMP OR MASS IN BREAST: ICD-10-CM

## 2020-12-11 PROCEDURE — 77065 DX MAMMO INCL CAD UNI: CPT

## 2020-12-11 PROCEDURE — 76642 ULTRASOUND BREAST LIMITED: CPT

## 2020-12-17 DIAGNOSIS — E06.3 HASHIMOTO'S DISEASE: Primary | ICD-10-CM

## 2020-12-19 LAB
T3 SERPL-MCNC: 103 NG/DL (ref 71–180)
T4 FREE SERPL-MCNC: 1.47 NG/DL (ref 0.82–1.77)
TSH SERPL DL<=0.005 MIU/L-ACNC: 1.87 UIU/ML (ref 0.45–4.5)

## 2020-12-19 RX ORDER — LEVOTHYROXINE SODIUM 137 UG/1
TABLET ORAL
Qty: 85 TAB | Refills: 3
Start: 2020-12-19 | End: 2021-10-26 | Stop reason: SDUPTHER

## 2020-12-28 NOTE — PROGRESS NOTES
HISTORY OF PRESENT ILLNESS  Lily May is a 50 y.o. female. HPI NEW Patient presents for consultation at the request of Dr. Zachariah Cardona for RIGHT breast cyst. She started to feel this around Thanksgiving. It has become bothersome to her, as it is \"big\" and tender upon palpation. No family history of breast or ovarian cancer. Breast imaging-  [facility] Results (most recent):  Results from Hospital Encounter encounter on 12/11/20   NATY MAMMO RT DX INCL CAD    Narrative STUDY: Unilateral right Diagnostic Digital Mammography and right breast  ultrasound    INDICATION: Right breast lump    COMPARISON: 5/12/2020, 4/30/2019, 4/2/2018, and 3/31/2017    BREAST COMPOSITION: Extremely dense    FINDINGS:     Unilateral right digital diagnostic mammography was performed, and is  interpreted in conjunction with a computer assisted detection (CAD) system. There is a new 19 mm nodular focus located in the right breast at the 9:00  position, posterior one third, located approximately 5.8 cm from the nipple,  corresponding to the palpable finding. No suspicious microcalcifications are  seen. There is no skin thickening or nipple retraction. Right breast ultrasound: Targeted ultrasound of the area of interest at the  10:00 position, 5 cm from the nipple demonstrates a macrolobulated anechoic  avascular cyst that measures up to 2.1 x 1.8 x 1.7 cm. There is no solid  component. Impression IMPRESSION:    1. Simple cyst corresponding to the mammographic finding. . No ultrasound or  mammographic evidence for malignancy. 2. Standard bilateral screening annual mammography is recommended in May, 2021.  3. The patient has been notified of these results and recommendations.    4. BI-RADS Category 2, benign       [place] Results (most recent):  Results from Hospital Encounter encounter on 12/11/20   US BREAST RT LIMITED=<3 QUAD    Narrative STUDY: Unilateral right Diagnostic Digital Mammography and right breast  ultrasound    INDICATION: Right breast lump    COMPARISON: 5/12/2020, 4/30/2019, 4/2/2018, and 3/31/2017    BREAST COMPOSITION: Extremely dense    FINDINGS:     Unilateral right digital diagnostic mammography was performed, and is  interpreted in conjunction with a computer assisted detection (CAD) system. There is a new 19 mm nodular focus located in the right breast at the 9:00  position, posterior one third, located approximately 5.8 cm from the nipple,  corresponding to the palpable finding. No suspicious microcalcifications are  seen. There is no skin thickening or nipple retraction. Right breast ultrasound: Targeted ultrasound of the area of interest at the  10:00 position, 5 cm from the nipple demonstrates a macrolobulated anechoic  avascular cyst that measures up to 2.1 x 1.8 x 1.7 cm. There is no solid  component. Impression IMPRESSION:    1. Simple cyst corresponding to the mammographic finding. . No ultrasound or  mammographic evidence for malignancy. 2. Standard bilateral screening annual mammography is recommended in May, 2021.  3. The patient has been notified of these results and recommendations. 4. BI-RADS Category 2, benign       Review of Systems   Constitutional: Negative. HENT: Negative. Eyes: Negative. Respiratory: Negative. Cardiovascular: Negative. Gastrointestinal: Positive for constipation. Genitourinary: Negative. Musculoskeletal: Positive for back pain and joint pain. Skin: Negative. Neurological: Negative. Endo/Heme/Allergies: Negative. Psychiatric/Behavioral: Negative. All other systems reviewed and are negative. Physical Exam  Vitals signs and nursing note reviewed. Constitutional:       Appearance: She is well-developed. HENT:      Head: Normocephalic. Neck:      Thyroid: No thyromegaly. Pulmonary:      Effort: Pulmonary effort is normal.   Chest:      Breasts: Breasts are symmetrical.         Right: Mass (2 cm mobile mass 9:00 ) present.  No inverted nipple, nipple discharge, skin change or tenderness. Left: No inverted nipple, mass, nipple discharge, skin change or tenderness. Abdominal:      Palpations: Abdomen is soft. Musculoskeletal: Normal range of motion. Lymphadenopathy:      Cervical: No cervical adenopathy. Skin:     General: Skin is warm and dry. Neurological:      Mental Status: She is alert and oriented to person, place, and time. ASPIRATION OF BREAST CYST  Indication : CYST:  right  Breast 9:00  Ultrasound Findings: 2.1 cm simple cyst right breast   Prep : Alcohol. Anesthesia : Lidocaine with epinephrine, 5 cc  Guidance : Ultrasound guidance. Yield :  3 cc clear fluid was aspirated with an 18 gauge needle. Effect : Cyst completely aspirated. Diposition:  Follow up if new mass occurs  ASSESSMENT and PLAN    ICD-10-CM ICD-9-CM    1. Cyst of right breast  N60.01 610.0      51 yo female with right breast cyst completely aspirated.    F/u if new cyst occurs

## 2020-12-30 ENCOUNTER — OFFICE VISIT (OUTPATIENT)
Dept: SURGERY | Age: 55
End: 2020-12-30
Payer: COMMERCIAL

## 2020-12-30 VITALS
DIASTOLIC BLOOD PRESSURE: 71 MMHG | TEMPERATURE: 96.7 F | WEIGHT: 142 LBS | SYSTOLIC BLOOD PRESSURE: 138 MMHG | HEIGHT: 65 IN | HEART RATE: 65 BPM | BODY MASS INDEX: 23.66 KG/M2

## 2020-12-30 DIAGNOSIS — N60.01 CYST OF RIGHT BREAST: Primary | ICD-10-CM

## 2020-12-30 PROCEDURE — 99243 OFF/OP CNSLTJ NEW/EST LOW 30: CPT | Performed by: SURGERY

## 2020-12-30 PROCEDURE — 19000 PUNCTURE ASPIR CYST BREAST: CPT | Performed by: SURGERY

## 2020-12-30 PROCEDURE — 76942 ECHO GUIDE FOR BIOPSY: CPT | Performed by: SURGERY

## 2020-12-30 NOTE — LETTER
12/30/2020 Patient: Brian Dennis YOB: 1965 Date of Visit: 12/30/2020 France Strickland MD 
[address] [place] [id number] Via Fax: [phone] Dear France Strickland MD, Thank you for referring Ms. Barbara Stacy to [address] for evaluation. My notes for this consultation are attached. If you have questions, please do not hesitate to call me. I look forward to following your patient along with you. Sincerely, Milady Chambers MD

## 2020-12-30 NOTE — PATIENT INSTRUCTIONS

## 2021-01-14 RX ORDER — LIOTHYRONINE SODIUM 5 UG/1
TABLET ORAL
Qty: 90 TAB | Refills: 3 | Status: SHIPPED | OUTPATIENT
Start: 2021-01-14 | End: 2022-01-19 | Stop reason: SDUPTHER

## 2021-05-17 ENCOUNTER — TRANSCRIBE ORDER (OUTPATIENT)
Dept: SCHEDULING | Age: 56
End: 2021-05-17

## 2021-05-17 DIAGNOSIS — Z12.31 SCREENING MAMMOGRAM FOR HIGH-RISK PATIENT: Primary | ICD-10-CM

## 2021-05-24 ENCOUNTER — HOSPITAL ENCOUNTER (OUTPATIENT)
Dept: MAMMOGRAPHY | Age: 56
Discharge: HOME | End: 2021-05-24
Attending: SPECIALIST
Payer: COMMERCIAL

## 2021-05-24 DIAGNOSIS — Z12.31 SCREENING MAMMOGRAM FOR HIGH-RISK PATIENT: ICD-10-CM

## 2021-05-24 PROCEDURE — 77067 SCR MAMMO BI INCL CAD: CPT

## 2021-10-15 LAB
T3 SERPL-MCNC: 82 NG/DL (ref 71–180)
T4 FREE SERPL-MCNC: 1.25 NG/DL (ref 0.82–1.77)
TSH SERPL DL<=0.005 MIU/L-ACNC: 1.04 UIU/ML (ref 0.45–4.5)

## 2021-10-19 DIAGNOSIS — E06.3 HASHIMOTO'S DISEASE: ICD-10-CM

## 2021-10-26 RX ORDER — LEVOTHYROXINE SODIUM 137 UG/1
TABLET ORAL
Qty: 85 TABLET | Refills: 3 | Status: SHIPPED | OUTPATIENT
Start: 2021-10-26 | End: 2022-08-11

## 2021-11-02 ENCOUNTER — OFFICE VISIT (OUTPATIENT)
Dept: ENDOCRINOLOGY | Age: 56
End: 2021-11-02
Payer: COMMERCIAL

## 2021-11-02 VITALS
HEART RATE: 61 BPM | HEIGHT: 65 IN | SYSTOLIC BLOOD PRESSURE: 101 MMHG | DIASTOLIC BLOOD PRESSURE: 57 MMHG | BODY MASS INDEX: 22.49 KG/M2 | WEIGHT: 135 LBS

## 2021-11-02 DIAGNOSIS — E06.3 HASHIMOTO'S DISEASE: Primary | ICD-10-CM

## 2021-11-02 PROCEDURE — 99214 OFFICE O/P EST MOD 30 MIN: CPT | Performed by: INTERNAL MEDICINE

## 2021-11-02 RX ORDER — MIRABEGRON 50 MG/1
50 TABLET, FILM COATED, EXTENDED RELEASE ORAL DAILY
COMMUNITY
Start: 2021-09-14

## 2021-11-02 RX ORDER — DROSPIRENONE AND ETHINYL ESTRADIOL 0.02-3(28)
1 KIT ORAL DAILY
COMMUNITY
Start: 2021-10-14

## 2021-11-02 NOTE — PROGRESS NOTES
Chief Complaint   Patient presents with    Thyroid Problem     History of Present Illness: Shivani Cruz is a 49 y.o. female here for follow up of thyroid. Has remained on unithroid 6.5 tabs/week and the cytomel 1 tab daily. No chest pain or palpitations or tremors. Bowels tend to be slower and takes metamucil for this but unchanged. Overall energy is better over the past 6 months and was started on low dose nalrexone by Dr. Annette Saini. No change in skin, hair, or nails and was having some hair loss that has slowed. Tends to stay cold but unchanged. Weight is 135 today up from 130 in 10/20. No dizziness with her current blood pressure. Current Outpatient Medications   Medication Sig    Zaira, 28, 3-0.02 mg tab Take 1 Tablet by mouth daily. Myrbetriq 50 mg ER tablet Take 50 mg by mouth daily. naltrexone HCl (NALTREXONE PO) Take 10 mg by mouth daily. Unithroid 137 mcg tablet TAKE 1 TABLET BY MOUTH ON  MONDAY TO SATURDAY AND 1/2  TABLET ON SUNDAY    liothyronine (CYTOMEL) 5 mcg tablet TAKE 1 TABLET BY MOUTH  DAILY AT LUNCH    OTHER,NON-FORMULARY, Berberine one tab tid    OTHER,NON-FORMULARY, apha lipoic acid tablets tid    metFORMIN (GLUCOPHAGE) 500 mg tablet Take 1,000 mg by mouth daily. cyanocobalamin, vitamin B-12, (VITAMIN B-12 PO) Take  by mouth daily. OTHER,NON-FORMULARY, Rhodiola po daily    OTHER Black seed oil 1 per day    testosterone 75 mg pllt by Implant route. Every 5-6 months from Dr. Cecy Moreland    POTASSIUM/MAGNESIUM (MAGNESIUM-POTASSIUM PO) Take  by mouth daily. No current facility-administered medications for this visit. No Known Allergies     Review of Systems: PER HPI    Physical Examination:  Blood pressure (!) 101/57, pulse 61, height 5' 5\" (1.651 m), weight 135 lb (61.2 kg).   General: pleasant, no distress, good eye contact   Neck: small goiter without nodules  Cardiovascular: regular, normal rate, nl s1 and s2, no m/r/g   Respiratory: clear to auscultation bilaterally   Integumentary: skin is normal, no edema  Neurological: reflexes 2+ at biceps, no tremors  Psychiatric: normal mood and affect    Data Reviewed:   Component      Latest Ref Rng & Units 10/14/2021 10/14/2021 10/14/2021          12:19 PM 12:19 PM 12:19 PM   T4, Free      0.82 - 1.77 ng/dL   1.25   TSH      0.450 - 4.500 uIU/mL  1.040    T3, total      71 - 180 ng/dL 82         Assessment/Plan:     1. Hashimoto's disease: Was diagnosed about 2014 and was initially put on levothyroxine by [facility] but decided to be seen by [address] [place] [address] and was tried on rabbit thyroid drops but still didn't feel well and Dr. Cecy Moreland recommended taking a pill instead and since she wanted to first stay on the natural thyroid hormone, he prescribed armour thyroid and initially was on 180 mg daily and TSH was 0.05 in 12/16 and her dose was cut back to 150 mg daily and TSH was still low at 0.034 in 3/17. Testosterone was low at < 3 in 3/17 and was started on pellet implantation at that time. Thinks her armour was then cut back to 120 mg daily and TSH was up to 0.247 in 5/17 and testosterone was 220. Then TSH was back to normal at 1.14 and FT4 was low at 0.7 in 6/17 and dose was kept the same and most recently in 9/17, TSH was 0.905 and FT4 0.73 and T was 283 and hasn't had further dose changes. Did have her 2nd pellet insertion in 8/17. Takes the armour about 90 minutes after dinner. TSH 1.28 and TPO ab 134 in 12/17 at her first visit with me confirming Hashimoto's. I kept her dose the same and given she was having daytime fatigue and some insomnia, advised her to move the armour to the morning to see if this helps with energy. TSH 0.52 and T3 182 and FT4 0.77 and TPO ab 110 in 4/18. She wanted to try synthetic so changed to unithroid.   120 of armour = 200 of unithroid but I was concerned this dose would be too much based on her body weight and already has some fluttering so used 175 mcg of unithroid as a starting dose with 88 mcg tabs to take 2 tabs daily for the next 5 weeks and TSH 0.139 and FT4 2.04 in 5/18 so cut back to 150 mcg daily. Lost 3 lbs and TSH still low at 0.10 and FT4 1.78 in 10/18 so decreased to 137 mcg daily. TSH 0.25 in 12/18 and decreased to 6.5 tabs/week but TSH up to 3.02 in 2/19 and wt up 9 lbs so increased back to 1 tab daily and TSH 2.07 in 4/19. Still having afternoon fatigue so decreased unithroid to 6 tabs/week and added cytomel 5 mcg daily and TSH 1.74 in 5/19 so kept dose the same. TSH 2.18 in 10/19 so increased unithroid back to 6.5 tabs/week and TSH 0.96 in 12/19 so kept dose the same. TSH down to 0.50 in 4/20 but having more palpitations so went back to 6 tabs/week as wt down 7 lbs and TSH 0.49 in 7/20 and 0.97 in 10/20 so kept dose the same. TSH up to 2.56 on 12/1/20 with Dr. Julia Gómez but down to 1.87 on 12/18/20 but we agreed to increase her unithroid back to 6.5 tabs/week and TSH 1.04 in 10/21 so kept dose the same. - cont cytomel 5 mcg daily at lunch  - cont unithroid 137 1 tab on Mon-Sat and 1/2 tab on Sun  - check TSH and free T4 and total T3 prior to next visit       Patient Instructions   1) TSH is a thyroid test.  Your level is 1.04 which is normal and at goal of 0.45-2.0. This test goes opposite of your thyroid dose and suggests your dose of unithroid and cytomel is perfect so I will keep your dose the same. 2) I will plan on seeing you back in one year but if you feel you need to have your labs checked sooner, please let me know. Follow-up and Dispositions    Return in about 1 year (around 11/2/2022). Copy sent to:  Dr. Kortney Miller MD      Addendum: 08/11/22    We had the following mychart exchange:    That's fine with me. You don't need additional labs under my name. I'll update your med list with this change and you can stay on this dose until you come back in November. Keep taking one cytomel everyday. Plan on repeating your labs 1-2 weeks before your next visit with me. Take care and hope you are well.    ===View-only below this line===      ----- Message -----       From:Aga Soto  5:33 PM EDT         To:Damir Perez MD    Subject:Tsh 1.58    Dr. Severino Jackson,    Can I increase my Sunday 1/2 thyroid medication to 1 tablet? I have been feeling tired lately and my PCP ran some tests and my TSH is now 1.58. I feel best just below 1. Wondering if that would help with tiredness. If you prefer, I can get thyroid labs under your name. I believe I have an outstanding order for this. My annual appointment with you is in November.

## 2021-11-02 NOTE — PATIENT INSTRUCTIONS
1) TSH is a thyroid test.  Your level is 1.04 which is normal and at goal of 0.45-2.0. This test goes opposite of your thyroid dose and suggests your dose of unithroid and cytomel is perfect so I will keep your dose the same. 2) I will plan on seeing you back in one year but if you feel you need to have your labs checked sooner, please let me know.

## 2022-01-19 RX ORDER — LIOTHYRONINE SODIUM 5 UG/1
TABLET ORAL
Qty: 90 TABLET | Refills: 3 | Status: SHIPPED | OUTPATIENT
Start: 2022-01-19

## 2022-05-09 ENCOUNTER — TRANSCRIBE ORDER (OUTPATIENT)
Dept: SCHEDULING | Age: 57
End: 2022-05-09

## 2022-05-09 DIAGNOSIS — Z12.31 SCREENING MAMMOGRAM FOR HIGH-RISK PATIENT: Primary | ICD-10-CM

## 2022-06-07 ENCOUNTER — HOSPITAL ENCOUNTER (OUTPATIENT)
Dept: MAMMOGRAPHY | Age: 57
Discharge: HOME | End: 2022-06-07
Payer: COMMERCIAL

## 2022-06-07 DIAGNOSIS — Z12.31 SCREENING MAMMOGRAM FOR HIGH-RISK PATIENT: ICD-10-CM

## 2022-06-07 PROCEDURE — 77067 SCR MAMMO BI INCL CAD: CPT

## 2022-08-11 RX ORDER — LEVOTHYROXINE SODIUM 137 UG/1
TABLET ORAL
Qty: 85 TABLET | Refills: 3
Start: 2022-08-11 | End: 2022-09-28 | Stop reason: SDUPTHER

## 2022-08-14 ENCOUNTER — TELEPHONE (OUTPATIENT)
Dept: ENDOCRINOLOGY | Age: 57
End: 2022-08-14

## 2022-08-14 NOTE — TELEPHONE ENCOUNTER
Please call pt to let her know she has an unread message in [employer]:    Syed Damian MD To   Virgie Haley and Delivered   8/11/2022  5:48 PM         That's fine with me. You don't need additional labs under my name. I'll update your med list with this change and you can stay on this dose until you come back in November. Keep taking one cytomel everyday. Plan on repeating your labs 1-2 weeks before your next visit with me. Take care and hope you are well. Previous Messages    ----- Message -----        From:Aga Garcia  5:33 PM EDT          To:Damir Meyers MD     Subject:Tsh 1.58     Dr. Yasmin Magdaleno,     Can I increase my Sunday 1/2 thyroid medication to 1 tablet? I have been feeling tired lately and my PCP ran some tests and my TSH is now 1.58. I feel best just below 1. Wondering if that would help with tiredness. If you prefer, I can get thyroid labs under your name. I believe I have an outstanding order for this. My annual appointment with you is in November. Thanks!      Jorge Gould

## 2022-08-15 NOTE — TELEPHONE ENCOUNTER
Informed pt that Dr. Zuleima Onofre has sent her a [employer] message that she has not read yet read. Pt states she read it and will reply.

## 2022-09-28 RX ORDER — LEVOTHYROXINE SODIUM 137 UG/1
TABLET ORAL
Qty: 90 TABLET | Refills: 3 | Status: SHIPPED | OUTPATIENT
Start: 2022-09-28 | End: 2022-10-15

## 2022-10-15 LAB
T3 SERPL-MCNC: 63 NG/DL (ref 71–180)
T4 FREE SERPL-MCNC: 1.48 NG/DL (ref 0.82–1.77)
TSH SERPL DL<=0.005 MIU/L-ACNC: 1.47 UIU/ML (ref 0.45–4.5)

## 2022-10-15 RX ORDER — LEVOTHYROXINE SODIUM 137 UG/1
TABLET ORAL
Qty: 90 TABLET | Refills: 3
Start: 2022-10-15

## 2022-10-16 DIAGNOSIS — E06.3 HASHIMOTO'S DISEASE: ICD-10-CM

## 2022-11-03 ENCOUNTER — OFFICE VISIT (OUTPATIENT)
Dept: ENDOCRINOLOGY | Age: 57
End: 2022-11-03
Payer: COMMERCIAL

## 2022-11-03 VITALS
HEART RATE: 87 BPM | WEIGHT: 134.6 LBS | HEIGHT: 65 IN | DIASTOLIC BLOOD PRESSURE: 85 MMHG | BODY MASS INDEX: 22.42 KG/M2 | SYSTOLIC BLOOD PRESSURE: 120 MMHG

## 2022-11-03 DIAGNOSIS — E06.3 HASHIMOTO'S DISEASE: Primary | ICD-10-CM

## 2022-11-03 PROCEDURE — 99214 OFFICE O/P EST MOD 30 MIN: CPT | Performed by: INTERNAL MEDICINE

## 2022-11-03 RX ORDER — SULFASALAZINE 500 MG/1
TABLET ORAL
COMMUNITY
Start: 2022-10-01

## 2022-11-03 RX ORDER — LEVOTHYROXINE SODIUM 125 UG/1
125 TABLET ORAL
Qty: 14 TABLET | Refills: 0 | Status: SHIPPED | COMMUNITY
Start: 2022-11-03 | End: 2022-11-12 | Stop reason: DRUGHIGH

## 2022-11-03 NOTE — PATIENT INSTRUCTIONS
1) TSH is a thyroid test.  Your level is 1.4 which is normal and at goal of 0.45-2.0. This test goes opposite of your thyroid dose and suggests your dose of unithroid and cytomel is likely adequate but since you are on the equivalent of 127 mcg/d of unithroid, I will try changing to the 125 mcg tabs to take 1 tab 7 days a week in addition to the one tab of cytomel at lunch for the next 2 weeks. If you feel better on this dose, then let me know and I'll write a prescription but if you feel worse, then stay on the 137 mcg tabs taking 6.5 tabs/week. 2) If you do end up staying on the 125 mcg dose then plan on repeating labs 6 weeks after you have been on this dose.

## 2022-11-03 NOTE — PROGRESS NOTES
Chief Complaint   Patient presents with    Thyroid Problem     PCP and pharmacy confirmed     History of Present Illness: Lorelei Yepez is a 56 y.o. female here for follow up of thyroid. Weight down 1 lbs since last visit in 11/21. She had written in 8/22 about going back to 1 tab daily of unithroid but had more trouble sleeping and on 9/25/22 went back to 6.5 tabs/week and remained on this dose in addition to one tab of cytomel at lunch. Her sleep is still not good on either dose. Was given Prem Coffey to take as needed but didn't want to get addicted to this so just takes one 10 mg tab about once a week. Melatonin doesn't work for her. Did notice more palpitations on 7 tabs/week so went back to 6.5 tabs/week. Does have more fatigue from not sleeping. Bowels are usually constipated and takes magnesium and metamucil to help and both are at night. No hair loss or brittle nails at this time but was having a little more hair loss in 8/22 that has slowed down. Tends to stay cold especially after she eats. Still on the same dose of OCP and dose of T pellets. Does sometimes feel jumpy and is willing to try lowering her unithroid to 125 mcg daily to see if this helps with sleep and energy. Current Outpatient Medications   Medication Sig    sulfaSALAzine (AZULFIDINE) 500 mg tablet 1500 mg BID    Unithroid 137 mcg tablet TAKE 1 TABLET BY MOUTH ON MON-SAT AND 1/2 TAB ON SUN--Dose change 10/15/22--updated med list--did not send prescription to the pharmacy    liothyronine (CYTOMEL) 5 mcg tablet TAKE 1 TABLET BY MOUTH  DAILY AT LUNCH    Zaira 28, 3-0.02 mg tab Take 1 Tablet by mouth daily. Myrbetriq 50 mg ER tablet Take 50 mg by mouth daily. naltrexone HCl (NALTREXONE PO) Take 10 mg by mouth daily. OTHER,NON-FORMULARY, apha lipoic acid tablet daily    metFORMIN (GLUCOPHAGE) 500 mg tablet Take 1,000 mg by mouth daily. cyanocobalamin, vitamin B-12, (VITAMIN B-12 PO) Take  by mouth daily. OTHER,NON-FORMULARY, Rhodiola po daily    OTHER Black seed oil 1 per day    testosterone 75 mg pllt by Implant route. Every 5-6 months from Dr. Charley Bunn    POTASSIUM/MAGNESIUM (MAGNESIUM-POTASSIUM PO) Take  by mouth daily. No current facility-administered medications for this visit. No Known Allergies    Review of Systems: PER HPI    Physical Examination:  Blood pressure 120/85, pulse 87, height 5' 5\" (1.651 m), weight 134 lb 9.6 oz (61.1 kg). General: pleasant, no distress, good eye contact   Neck: no thyromegaly   Cardiovascular: regular, normal rate, nl s1 and s2, no m/r/g   Respiratory: clear to auscultation bilaterally   Integumentary: skin is normal, no edema  Neurological: reflexes 2+ at biceps, very mild tremors  Psychiatric: normal mood and affect    Data Reviewed:   Component      Latest Ref Rng & Units 10/14/2022 10/14/2022 10/14/2022          12:24 PM 12:24 PM 12:24 PM   TSH      0.450 - 4.500 uIU/mL   1.470   T4, Free      0.82 - 1.77 ng/dL  1.48    T3, total      71 - 180 ng/dL 63 (L)         Assessment/Plan:     1. Hashimoto's disease: Was diagnosed about 2014 and was initially put on levothyroxine by [facility] but decided to be seen by [address] [place] [address] and was tried on rabbit thyroid drops but still didn't feel well and Dr. Charley Bunn recommended taking a pill instead and since she wanted to first stay on the natural thyroid hormone, he prescribed armour thyroid and initially was on 180 mg daily and TSH was 0.05 in 12/16 and her dose was cut back to 150 mg daily and TSH was still low at 0.034 in 3/17. Testosterone was low at < 3 in 3/17 and was started on pellet implantation at that time. Thinks her armour was then cut back to 120 mg daily and TSH was up to 0.247 in 5/17 and testosterone was 220.   Then TSH was back to normal at 1.14 and FT4 was low at 0.7 in 6/17 and dose was kept the same and most recently in 9/17, TSH was 0.905 and FT4 0.73 and T was 283 and hasn't had further dose changes. Did have her 2nd pellet insertion in 8/17. Takes the armour about 90 minutes after dinner. TSH 1.28 and TPO ab 134 in 12/17 at her first visit with me confirming Hashimoto's. I kept her dose the same and given she was having daytime fatigue and some insomnia, advised her to move the armour to the morning to see if this helps with energy. TSH 0.52 and T3 182 and FT4 0.77 and TPO ab 110 in 4/18. She wanted to try synthetic so changed to unithroid. 120 of armour = 200 of unithroid but I was concerned this dose would be too much based on her body weight and already has some fluttering so used 175 mcg of unithroid as a starting dose with 88 mcg tabs to take 2 tabs daily for the next 5 weeks and TSH 0.139 and FT4 2.04 in 5/18 so cut back to 150 mcg daily. Lost 3 lbs and TSH still low at 0.10 and FT4 1.78 in 10/18 so decreased to 137 mcg daily. TSH 0.25 in 12/18 and decreased to 6.5 tabs/week but TSH up to 3.02 in 2/19 and wt up 9 lbs so increased back to 1 tab daily and TSH 2.07 in 4/19. Still having afternoon fatigue so decreased unithroid to 6 tabs/week and added cytomel 5 mcg daily and TSH 1.74 in 5/19 so kept dose the same. TSH 2.18 in 10/19 so increased unithroid back to 6.5 tabs/week and TSH 0.96 in 12/19 so kept dose the same. TSH down to 0.50 in 4/20 but having more palpitations so went back to 6 tabs/week as wt down 7 lbs and TSH 0.49 in 7/20 and 0.97 in 10/20 so kept dose the same. TSH up to 2.56 on 12/1/20 with Dr. Vanna Atkinson but down to 1.87 on 12/18/20 but we agreed to increase her unithroid back to 6.5 tabs/week and TSH 1.04 in 10/21 so kept dose the same. TSH 1.58 in 8/22 with PCP and she asked to increase her unithroid back to 7 tabs/week but felt more palpitations on this dose and went back to 6.5 tabs/week = 127 mcg/d on 9/25/22 and TSH 1.47 on 10/14/22.   We agreed to try changing unithroid to 125 mcg 7 days/week to see if she feels better with a lower dose of unithroid in her system. - cont cytomel 5 mcg daily at lunch  - decrease unithroid to 125 mcg daily  - check TSH and free T4 and total T3 in 6 weeks if she stays on the 125 mcg dose and prior to next visit       Patient Instructions   1) TSH is a thyroid test.  Your level is 1.4 which is normal and at goal of 0.45-2.0. This test goes opposite of your thyroid dose and suggests your dose of unithroid and cytomel is likely adequate but since you are on the equivalent of 127 mcg/d of unithroid, I will try changing to the 125 mcg tabs to take 1 tab 7 days a week in addition to the one tab of cytomel at lunch for the next 2 weeks. If you feel better on this dose, then let me know and I'll write a prescription but if you feel worse, then stay on the 137 mcg tabs taking 6.5 tabs/week. 2) If you do end up staying on the 125 mcg dose then plan on repeating labs 6 weeks after you have been on this dose. Follow-up and Dispositions    Return in about 1 year (around 11/3/2023). Copy sent to:  Dr. Shasta Almanzar MD    Lab follow up: 12/22/22  Component      Latest Ref Rng & Units 12/21/2022 12/21/2022 12/21/2022          11:35 AM 11:35 AM 11:35 AM   T4, Free      0.82 - 1.77 ng/dL   1.23   TSH      0.450 - 4.500 uIU/mL  1.050    T3, total      71 - 180 ng/dL 52 (L)       Sent her the following message through [email or web address]:  TSH is a thyroid test.  Your level is 1.05 which is normal and at goal of 0.45-2.0. This test goes opposite of your thyroid dose and suggests your dose of unithroid and liothyronine is perfect so I will keep your dose the same.  -------------------------------------------------------------------------------------------------------------------  Your T3 level was low but this was drawn at 11:35 am which I would surmise was about 24 hours after your last dose as you tend to take yours at lunch so I'm not concerned.

## 2022-11-12 RX ORDER — LEVOTHYROXINE SODIUM 125 UG/1
125 TABLET ORAL
Qty: 90 TABLET | Refills: 3 | Status: SHIPPED | OUTPATIENT
Start: 2022-11-12

## 2022-12-12 RX ORDER — LIOTHYRONINE SODIUM 5 UG/1
TABLET ORAL
Qty: 90 TABLET | Refills: 3 | Status: SHIPPED | OUTPATIENT
Start: 2022-12-12

## 2023-01-06 RX ORDER — LIOTHYRONINE SODIUM 5 UG/1
TABLET ORAL
Qty: 90 TABLET | Refills: 3 | Status: SHIPPED | OUTPATIENT
Start: 2023-01-06

## 2023-04-22 DIAGNOSIS — E06.3 HASHIMOTO'S DISEASE: Primary | ICD-10-CM

## 2023-04-23 DIAGNOSIS — E06.3 HASHIMOTO'S DISEASE: Primary | ICD-10-CM

## 2023-09-18 RX ORDER — LEVOTHYROXINE SODIUM 125 UG/1
125 TABLET ORAL
Qty: 90 TABLET | Refills: 3 | Status: SHIPPED | OUTPATIENT
Start: 2023-09-18

## 2023-10-18 DIAGNOSIS — E06.3 HASHIMOTO'S DISEASE: ICD-10-CM

## 2023-10-18 LAB
T3 SERPL-MCNC: 62 NG/DL (ref 71–180)
T4 FREE SERPL-MCNC: 1.59 NG/DL (ref 0.82–1.77)
TSH SERPL DL<=0.005 MIU/L-ACNC: 0.65 UIU/ML (ref 0.45–4.5)

## 2023-11-03 ENCOUNTER — OFFICE VISIT (OUTPATIENT)
Age: 58
End: 2023-11-03
Payer: COMMERCIAL

## 2023-11-03 VITALS
BODY MASS INDEX: 22.13 KG/M2 | HEART RATE: 67 BPM | SYSTOLIC BLOOD PRESSURE: 122 MMHG | DIASTOLIC BLOOD PRESSURE: 77 MMHG | HEIGHT: 65 IN | WEIGHT: 132.8 LBS

## 2023-11-03 DIAGNOSIS — E06.3 HASHIMOTO'S DISEASE: Primary | ICD-10-CM

## 2023-11-03 PROCEDURE — 99214 OFFICE O/P EST MOD 30 MIN: CPT | Performed by: INTERNAL MEDICINE

## 2023-11-03 RX ORDER — LEVOTHYROXINE SODIUM 125 UG/1
125 TABLET ORAL
Qty: 90 TABLET | Refills: 3
Start: 2023-11-03

## 2023-11-03 RX ORDER — LANOLIN ALCOHOL/MO/W.PET/CERES
1.5 CREAM (GRAM) TOPICAL DAILY
COMMUNITY

## 2023-11-03 RX ORDER — ESCITALOPRAM OXALATE 5 MG/1
5 TABLET ORAL
COMMUNITY

## 2023-11-03 RX ORDER — LIOTHYRONINE SODIUM 5 UG/1
TABLET ORAL
Qty: 180 TABLET | Refills: 3 | Status: SHIPPED | OUTPATIENT
Start: 2023-11-03

## 2023-11-03 NOTE — PROGRESS NOTES
Chief Complaint   Patient presents with    Thyroid Problem     PCP and pharmacy confirmed      History of Present Illness: Rony Lucia is a 66 y.o. female here for follow up of thyroid. Weight down 2 lbs since last visit in 11/22. Has been on the lower dose of unithroid and not having any palpitations but still having a lot of trouble with daytime fatigue but still doesn't sleep well despite using valerian root and magnesium and melatonin. Still having some constipation. Would like to try increasing her cytomel to see if this helps. Current Outpatient Medications   Medication Sig    Cyanocobalamin (B-12 PO) Take by mouth daily    NONFORMULARY Rhodiola po daily    NONFORMULARY Black seed oil 1 per day    MAGNESIUM-POTASSIUM PO Take by mouth daily    VALERIAN ROOT PO Take by mouth daily    Cholecalciferol (VITAMIN D3 PO) Take by mouth daily    escitalopram (LEXAPRO) 5 MG tablet Take 1 tablet by mouth nightly    melatonin 3 MG TABS tablet Take 0.5 tablets by mouth daily    UNITHROID 125 MCG tablet TAKE 1 TABLET BY MOUTH     DAILY  BEFORE BREAKFAST    drospirenone-ethinyl estradiol (BRENNEN) 3-0.02 MG per tablet Take 1 tablet by mouth daily    liothyronine (CYTOMEL) 5 MCG tablet TAKE 1 TABLET BY MOUTH DAILY AT LUNCH    metFORMIN (GLUCOPHAGE) 500 MG tablet Take 2 tablets by mouth daily    sulfaSALAzine (AZULFIDINE) 500 MG tablet 1500 mg BID    testosterone (TESTOPEL) 75 MG PLLT pellets by Implant route. Every 5-6 months from Dr. Harish Bess     No current facility-administered medications for this visit. No Known Allergies    Review of Systems: PER HPI    Physical Examination:  Blood pressure 122/77, pulse 67, height 1.651 m (5' 5\"), weight 60.2 kg (132 lb 12.8 oz).   General: pleasant, no distress, good eye contact   Neck: no thyromegaly   Cardiovascular: regular, normal rate, nl s1 and s2, no m/r/g   Respiratory: clear to auscultation bilaterally   Integumentary: skin is normal, no edema  Neurological:

## 2023-11-03 NOTE — PATIENT INSTRUCTIONS
1) Your TSH (thyroid test) is normal at 0.6 but given your T3 is low and you are still having fatigue, we'll try decreasing your unithroid to 1 tab on Mon-Sat and NONE on Sun and instead increasing your cytomel (liothyronine) to 1 tab in the morning at the same time as the unithroid AND one tab at lunch and this will be 7 days a week. 2) Do this for 2 months and then repeat labs. I will send you a message through [employer] with your lab results. 3) I will plan on seeing you back in one year but if you feel you need to have your labs checked sooner, please let me know.

## 2024-01-03 DIAGNOSIS — E06.3 HASHIMOTO'S DISEASE: ICD-10-CM

## 2024-01-04 LAB — TSH SERPL DL<=0.005 MIU/L-ACNC: 0.67 UIU/ML (ref 0.45–4.5)

## 2024-01-05 LAB
T3 SERPL-MCNC: 136 NG/DL (ref 71–180)
T4 FREE SERPL-MCNC: 1.39 NG/DL (ref 0.82–1.77)

## 2024-06-12 ENCOUNTER — HOSPITAL ENCOUNTER (OUTPATIENT)
Facility: HOSPITAL | Age: 59
Discharge: HOME | End: 2024-06-15
Payer: COMMERCIAL

## 2024-06-12 VITALS — BODY MASS INDEX: 21.66 KG/M2 | HEIGHT: 65 IN | WEIGHT: 130 LBS

## 2024-06-12 DIAGNOSIS — Z12.31 SCREENING MAMMOGRAM FOR BREAST CANCER: ICD-10-CM

## 2024-06-12 PROCEDURE — 77063 BREAST TOMOSYNTHESIS BI: CPT

## 2024-06-24 ENCOUNTER — HOSPITAL ENCOUNTER (OUTPATIENT)
Facility: HOSPITAL | Age: 59
Discharge: HOME | End: 2024-06-27
Payer: COMMERCIAL

## 2024-06-24 DIAGNOSIS — R92.8 ABNORMAL MAMMOGRAM: ICD-10-CM

## 2024-06-24 PROCEDURE — 76642 ULTRASOUND BREAST LIMITED: CPT

## 2024-06-24 PROCEDURE — G0279 TOMOSYNTHESIS, MAMMO: HCPCS

## 2024-09-07 RX ORDER — LEVOTHYROXINE SODIUM 125 UG/1
TABLET ORAL
Qty: 90 TABLET | Refills: 3 | Status: SHIPPED | OUTPATIENT
Start: 2024-09-07

## 2024-10-20 DIAGNOSIS — E06.3 HASHIMOTO'S DISEASE: ICD-10-CM

## 2024-10-22 LAB
T3 SERPL-MCNC: 66 NG/DL (ref 71–180)
T4 FREE SERPL-MCNC: 1.28 NG/DL (ref 0.82–1.77)

## 2024-10-23 LAB — TSH SERPL DL<=0.005 MIU/L-ACNC: 0.46 UIU/ML (ref 0.45–4.5)

## 2024-11-08 ENCOUNTER — OFFICE VISIT (OUTPATIENT)
Age: 59
End: 2024-11-08
Payer: COMMERCIAL

## 2024-11-08 VITALS
WEIGHT: 139.2 LBS | DIASTOLIC BLOOD PRESSURE: 71 MMHG | BODY MASS INDEX: 23.19 KG/M2 | HEIGHT: 65 IN | HEART RATE: 72 BPM | SYSTOLIC BLOOD PRESSURE: 110 MMHG

## 2024-11-08 DIAGNOSIS — E06.3 HASHIMOTO'S DISEASE: Primary | ICD-10-CM

## 2024-11-08 PROCEDURE — 99214 OFFICE O/P EST MOD 30 MIN: CPT | Performed by: INTERNAL MEDICINE

## 2024-11-08 RX ORDER — TRAZODONE HYDROCHLORIDE 50 MG/1
50 TABLET, FILM COATED ORAL NIGHTLY
COMMUNITY

## 2024-11-08 RX ORDER — NALTREXONE HYDROCHLORIDE 50 MG/1
TABLET, FILM COATED ORAL
COMMUNITY
Start: 2024-09-27

## 2024-11-08 RX ORDER — LINACLOTIDE 290 UG/1
290 CAPSULE, GELATIN COATED ORAL
COMMUNITY
Start: 2023-12-01

## 2024-11-08 NOTE — PROGRESS NOTES
Chief Complaint   Patient presents with    Thyroid Problem     PCP and pharmacy confirmed     History of Present Illness: Lianna Medellin is a 59 y.o. female here for follow up of thyroid.  Weight up 7 lbs since last visit in 11/23.  No major change to health since last visit.  Has felt better with her current dose of unithroid 6 tabs/week and cytomel 1 tab bid.  No chest pain or palpitations.  Bowels are regular with the linzess.  No change in hair, skin, or nails and still has some brittle nails.  Tends to stay cold but unchanged.  Did not take her T3 prior to her lab draw to explain the low level.  Just had testosterone pellet insertion 2 weeks ago and her weight does tend to go up 3-5 lbs within the week or two after the insertion.      Current Outpatient Medications   Medication Sig    traZODone (DESYREL) 50 MG tablet Take 1 tablet by mouth nightly    LINZESS 290 MCG CAPS capsule Take 1 capsule by mouth every morning (before breakfast)    naltrexone (DEPADE) 50 MG tablet DISSOLVE 50MG TAB IN 50ML OF WATER AND TAKE 10ML DAILY    diclofenac sodium (VOLTAREN) 1 % GEL APPLY 2 GRAMS TO THE AFFECTED AREA(S) BY TOPICAL ROUTE 4 TIMES PER DAY    UNITHROID 125 MCG tablet Take 1 tab on Mon-Sat and none on Sun--BRAND MEDICALLY NECESSARY    liothyronine (CYTOMEL) 5 MCG tablet Take 1 tab in the morning and at lunch    Cyanocobalamin (B-12 PO) Take by mouth daily    NONFORMULARY Black seed oil 1 per day    MAGNESIUM-POTASSIUM PO Take by mouth daily    VALERIAN ROOT PO Take by mouth daily    Cholecalciferol (VITAMIN D3 PO) Take by mouth daily    metFORMIN (GLUCOPHAGE) 500 MG tablet Take 2 tablets by mouth daily    sulfaSALAzine (AZULFIDINE) 500 MG tablet 1500 mg BID    testosterone (TESTOPEL) 75 MG PLLT pellets by Implant route. Every 5-6 months from Dr. Duke     No current facility-administered medications for this visit.     No Known Allergies    Review of Systems: PER HPI    Physical Examination:  Blood pressure 110/71,

## 2024-11-08 NOTE — PATIENT INSTRUCTIONS
1) TSH is a thyroid test.  Your level is 0.45 which is normal and at goal of 0.45-2.0.  The TSH goes opposite of your thyroid dose and suggests your dose of unithroid and liothyronine is perfect so I will keep your dose the same.    2) I will plan on seeing you back in one year but if you feel you need to have your labs checked sooner, please let me know, especially if your weight is up or down by more than 7-10 lbs.

## 2025-01-13 RX ORDER — LIOTHYRONINE SODIUM 5 UG/1
TABLET ORAL
Qty: 180 TABLET | Refills: 3 | Status: SHIPPED | OUTPATIENT
Start: 2025-01-13

## 2025-06-16 ENCOUNTER — HOSPITAL ENCOUNTER (OUTPATIENT)
Facility: HOSPITAL | Age: 60
Setting detail: OUTPATIENT SURGERY
Discharge: HOME | End: 2025-06-16
Attending: INTERNAL MEDICINE | Admitting: INTERNAL MEDICINE
Payer: COMMERCIAL

## 2025-06-16 VITALS
DIASTOLIC BLOOD PRESSURE: 85 MMHG | HEART RATE: 89 BPM | RESPIRATION RATE: 16 BRPM | SYSTOLIC BLOOD PRESSURE: 148 MMHG | OXYGEN SATURATION: 100 %

## 2025-06-16 PROCEDURE — 3600007502: Performed by: INTERNAL MEDICINE

## 2025-06-16 PROCEDURE — 3600007512: Performed by: INTERNAL MEDICINE

## 2025-06-16 PROCEDURE — C1726 CATH, BAL DIL, NON-VASCULAR: HCPCS | Performed by: INTERNAL MEDICINE

## 2025-06-16 PROCEDURE — 2709999900 HC NON-CHARGEABLE SUPPLY: Performed by: INTERNAL MEDICINE

## 2025-06-16 RX ORDER — PROGESTERONE 100 MG/1
CAPSULE ORAL
COMMUNITY
Start: 2025-04-08

## 2025-06-16 ASSESSMENT — PAIN - FUNCTIONAL ASSESSMENT
PAIN_FUNCTIONAL_ASSESSMENT: NONE - DENIES PAIN
PAIN_FUNCTIONAL_ASSESSMENT: NONE - DENIES PAIN

## 2025-06-16 NOTE — PROGRESS NOTES
Rectal exam done by NATY Yeager.  Anal manometry catheter inserted into rectum.  Manometry procedure complete.  Catheter inserted into rectum.  Balloon filled with 40cc of air and pt escorted to bathroom for 5 min expulsion test.  Pt was not able to expel balloon.  Balloon deflated and catheter removed.   Pt tolerated procedures well.

## 2025-06-16 NOTE — DISCHARGE INSTRUCTIONS
Lianna Medellin  [id number]  1965      MANOMETRY DISCHARGE INSTRUCTION    You may resume your regular diet as tolerated.  You may resume your normal daily activities.  Call your Physician if you have any complications or questions.    Discussed with the patient and all questioned fully answered. She will call me if any problems arise.

## 2025-07-24 ENCOUNTER — HOSPITAL ENCOUNTER (OUTPATIENT)
Facility: HOSPITAL | Age: 60
Discharge: HOME | End: 2025-07-27
Payer: COMMERCIAL

## 2025-07-24 DIAGNOSIS — Z12.31 SCREENING MAMMOGRAM FOR BREAST CANCER: ICD-10-CM

## 2025-07-24 PROCEDURE — 77063 BREAST TOMOSYNTHESIS BI: CPT

## 2025-08-05 ENCOUNTER — TRANSCRIBE ORDERS (OUTPATIENT)
Facility: HOSPITAL | Age: 60
End: 2025-08-05

## 2025-08-05 DIAGNOSIS — Z12.31 ENCOUNTER FOR SCREENING MAMMOGRAM FOR MALIGNANT NEOPLASM OF BREAST: Primary | ICD-10-CM

## 2025-08-12 ENCOUNTER — HOSPITAL ENCOUNTER (OUTPATIENT)
Facility: HOSPITAL | Age: 60
Discharge: HOME | End: 2025-08-15
Attending: SPECIALIST
Payer: COMMERCIAL

## 2025-08-12 DIAGNOSIS — R92.8 ABNORMAL MAMMOGRAM: ICD-10-CM

## 2025-08-12 PROCEDURE — G0279 TOMOSYNTHESIS, MAMMO: HCPCS

## 2025-08-12 PROCEDURE — 76642 ULTRASOUND BREAST LIMITED: CPT

## 2025-08-26 RX ORDER — LEVOTHYROXINE SODIUM 125 UG/1
TABLET ORAL
Qty: 78 TABLET | Refills: 3 | Status: SHIPPED | OUTPATIENT
Start: 2025-08-26

## 2025-09-04 ENCOUNTER — HOSPITAL ENCOUNTER (OUTPATIENT)
Facility: HOSPITAL | Age: 60
Discharge: HOME | End: 2025-09-04
Payer: COMMERCIAL

## 2025-09-04 VITALS
RESPIRATION RATE: 18 BRPM | OXYGEN SATURATION: 98 % | DIASTOLIC BLOOD PRESSURE: 79 MMHG | HEART RATE: 82 BPM | SYSTOLIC BLOOD PRESSURE: 145 MMHG | TEMPERATURE: 98.7 F | WEIGHT: 135.8 LBS | BODY MASS INDEX: 22.63 KG/M2 | HEIGHT: 65 IN

## 2025-09-04 LAB
BASOPHILS # BLD: 0.07 K/UL (ref 0–0.1)
BASOPHILS NFR BLD: 1.9 % (ref 0–1)
DIFFERENTIAL METHOD BLD: ABNORMAL
EOSINOPHIL # BLD: 0.03 K/UL (ref 0–0.4)
EOSINOPHIL NFR BLD: 0.8 % (ref 0–7)
ERYTHROCYTE [DISTWIDTH] IN BLOOD BY AUTOMATED COUNT: 14.1 % (ref 11.5–14.5)
HCT VFR BLD AUTO: 30.4 % (ref 35–47)
HGB BLD-MCNC: 9.5 G/DL (ref 11.5–16)
IMM GRANULOCYTES # BLD AUTO: 0.01 K/UL (ref 0–0.04)
IMM GRANULOCYTES NFR BLD AUTO: 0.3 % (ref 0–0.5)
LYMPHOCYTES # BLD: 0.92 K/UL (ref 0.8–3.5)
LYMPHOCYTES NFR BLD: 25 % (ref 12–49)
MCH RBC QN AUTO: 27.8 PG (ref 26–34)
MCHC RBC AUTO-ENTMCNC: 31.3 G/DL (ref 30–36.5)
MCV RBC AUTO: 88.9 FL (ref 80–99)
MONOCYTES # BLD: 0.36 K/UL (ref 0–1)
MONOCYTES NFR BLD: 9.8 % (ref 5–13)
NEUTS SEG # BLD: 2.29 K/UL (ref 1.8–8)
NEUTS SEG NFR BLD: 62.2 % (ref 32–75)
NRBC # BLD: 0 K/UL (ref 0–0.01)
NRBC BLD-RTO: 0 PER 100 WBC
PLATELET # BLD AUTO: 346 K/UL (ref 150–400)
PMV BLD AUTO: 8.8 FL (ref 8.9–12.9)
RBC # BLD AUTO: 3.42 M/UL (ref 3.8–5.2)
WBC # BLD AUTO: 3.7 K/UL (ref 3.6–11)

## 2025-09-04 PROCEDURE — 85025 COMPLETE CBC W/AUTO DIFF WBC: CPT

## 2025-09-04 RX ORDER — MAGNESIUM OXIDE/MAG AA CHELATE 300 MG
1 CAPSULE ORAL DAILY
COMMUNITY

## 2025-09-04 RX ORDER — PRUCALOPRIDE 2 MG/1
2 TABLET, FILM COATED ORAL DAILY
COMMUNITY

## 2025-09-04 RX ORDER — DOCUSATE SODIUM 100 MG/1
100 CAPSULE, LIQUID FILLED ORAL 2 TIMES DAILY
COMMUNITY

## 2025-09-04 ASSESSMENT — PAIN SCALES - GENERAL: PAINLEVEL_OUTOF10: 0

## (undated) DEVICE — CUFF BLD PRSS AD CLTH SGL TB W/ BAYNT CONN ROUNDED CORNER

## (undated) DEVICE — CATHETER BLLN DIL 26X48 MMX60 CM ANORECT BAROSTAT

## (undated) DEVICE — SHEATH CATH ANORECT MNOMTR